# Patient Record
Sex: FEMALE | Race: WHITE | HISPANIC OR LATINO | Employment: FULL TIME | URBAN - NONMETROPOLITAN AREA
[De-identification: names, ages, dates, MRNs, and addresses within clinical notes are randomized per-mention and may not be internally consistent; named-entity substitution may affect disease eponyms.]

---

## 2024-02-20 ENCOUNTER — OFFICE VISIT (OUTPATIENT)
Dept: URGENT CARE | Facility: PHYSICIAN GROUP | Age: 24
End: 2024-02-20

## 2024-02-20 ENCOUNTER — HOSPITAL ENCOUNTER (OUTPATIENT)
Facility: MEDICAL CENTER | Age: 24
End: 2024-02-20
Attending: PHYSICIAN ASSISTANT

## 2024-02-20 VITALS
HEART RATE: 93 BPM | DIASTOLIC BLOOD PRESSURE: 62 MMHG | HEIGHT: 64 IN | WEIGHT: 176 LBS | BODY MASS INDEX: 30.05 KG/M2 | SYSTOLIC BLOOD PRESSURE: 118 MMHG | TEMPERATURE: 98.3 F | RESPIRATION RATE: 16 BRPM | OXYGEN SATURATION: 98 %

## 2024-02-20 DIAGNOSIS — R11.0 NAUSEA: ICD-10-CM

## 2024-02-20 DIAGNOSIS — N12 PYELONEPHRITIS: ICD-10-CM

## 2024-02-20 DIAGNOSIS — R30.0 DYSURIA: ICD-10-CM

## 2024-02-20 DIAGNOSIS — Z87.448 HISTORY OF PYELONEPHRITIS: ICD-10-CM

## 2024-02-20 LAB
APPEARANCE UR: CLEAR
BILIRUB UR STRIP-MCNC: NORMAL MG/DL
COLOR UR AUTO: YELLOW
GLUCOSE UR STRIP.AUTO-MCNC: NORMAL MG/DL
KETONES UR STRIP.AUTO-MCNC: NORMAL MG/DL
LEUKOCYTE ESTERASE UR QL STRIP.AUTO: NORMAL
NITRITE UR QL STRIP.AUTO: NORMAL
PH UR STRIP.AUTO: 6 [PH] (ref 5–8)
POCT INT CON NEG: NEGATIVE
POCT INT CON POS: POSITIVE
POCT URINE PREGNANCY TEST: NEGATIVE
PROT UR QL STRIP: NORMAL MG/DL
RBC UR QL AUTO: NORMAL
SP GR UR STRIP.AUTO: 1.02
UROBILINOGEN UR STRIP-MCNC: 0.2 MG/DL

## 2024-02-20 PROCEDURE — 81002 URINALYSIS NONAUTO W/O SCOPE: CPT | Performed by: PHYSICIAN ASSISTANT

## 2024-02-20 PROCEDURE — 81025 URINE PREGNANCY TEST: CPT | Performed by: PHYSICIAN ASSISTANT

## 2024-02-20 PROCEDURE — 99204 OFFICE O/P NEW MOD 45 MIN: CPT | Performed by: PHYSICIAN ASSISTANT

## 2024-02-20 PROCEDURE — 87086 URINE CULTURE/COLONY COUNT: CPT

## 2024-02-20 RX ORDER — ONDANSETRON 4 MG/1
4 TABLET, FILM COATED ORAL EVERY 4 HOURS PRN
Qty: 10 TABLET | Refills: 0 | Status: SHIPPED | OUTPATIENT
Start: 2024-02-20 | End: 2024-03-27

## 2024-02-20 RX ORDER — SULFAMETHOXAZOLE AND TRIMETHOPRIM 800; 160 MG/1; MG/1
1 TABLET ORAL 2 TIMES DAILY
Qty: 20 TABLET | Refills: 0 | Status: SHIPPED | OUTPATIENT
Start: 2024-02-20 | End: 2024-03-01

## 2024-02-20 ASSESSMENT — ENCOUNTER SYMPTOMS
NAUSEA: 0
CHILLS: 1
VOMITING: 0
FLANK PAIN: 1

## 2024-02-20 NOTE — PROGRESS NOTES
Subjective:   Fabiola Alexis is a 23 y.o. female who presents for Dysuria (HA, Nausea, fever, chills, left flank pain, blood in urine.  Sx 2 days )        Patient presents with concerns of dysuria, urinary urgency, urinary frequency and left flank pain for the last 48 hours.  Additionally she noticed blood in her urine.  She reports tactile fevers and chills.  She has had nausea, but no vomiting.  Mild pelvic pain but no pain of the upper abdomen.  States that she had identical symptoms a month ago with a kidney infection and was in the hospital with this.  She denies history of kidney stones and states that she underwent a CT when she was hospitalized, as they initially thought that her symptoms may be secondary to kidney stones.  She also underwent STI testing, which was negative.  Patient denies concerns for STI, she is in a monogamous relationship with the same partner for several years.      Review of Systems   Constitutional:  Positive for chills.   Gastrointestinal:  Negative for nausea and vomiting.   Genitourinary:  Positive for dysuria, flank pain (left side), frequency, hematuria and urgency.       PMH:  has no past medical history on file.  MEDS:   Current Outpatient Medications:     ondansetron (ZOFRAN) 4 MG Tab tablet, Take 1 Tablet by mouth every four hours as needed for Nausea/Vomiting., Disp: 10 Tablet, Rfl: 0    sulfamethoxazole-trimethoprim (BACTRIM DS) 800-160 MG tablet, Take 1 Tablet by mouth 2 times a day for 10 days., Disp: 20 Tablet, Rfl: 0    Current Facility-Administered Medications:     cefTRIAXone (Rocephin) 1 g in lidocaine (Xylocaine) 1 % 4 mL for IM use, 1 g, Intramuscular, Once, Chris Tran P.A.-C.  ALLERGIES: No Known Allergies  SURGHX: History reviewed. No pertinent surgical history.  SOCHX:  reports that she has never smoked. She has never used smokeless tobacco. She reports current alcohol use. She reports current drug use. Drug: Marijuana.  FH: Family history was reviewed, no  "pertinent findings to report   Objective:   /62   Pulse 93   Temp 36.8 °C (98.3 °F) (Temporal)   Resp 16   Ht 1.626 m (5' 4\")   Wt 79.8 kg (176 lb)   SpO2 98%   BMI 30.21 kg/m²   Physical Exam  Vitals reviewed.   Constitutional:       General: She is not in acute distress.     Appearance: Normal appearance. She is well-developed. She is not toxic-appearing.   HENT:      Head: Normocephalic and atraumatic.      Right Ear: External ear normal.      Left Ear: External ear normal.      Nose: Nose normal.   Cardiovascular:      Rate and Rhythm: Normal rate and regular rhythm.   Pulmonary:      Effort: Pulmonary effort is normal. No respiratory distress.      Breath sounds: No stridor.   Abdominal:      General: Abdomen is flat.      Tenderness: There is abdominal tenderness in the suprapubic area. There is left CVA tenderness. There is no right CVA tenderness, guarding or rebound. Negative signs include McBurney's sign.   Skin:     General: Skin is dry.   Neurological:      Comments: Alert and oriented.    Psychiatric:         Speech: Speech normal.         Behavior: Behavior normal.           Assessment/Plan:   1. Pyelonephritis  - cefTRIAXone (Rocephin) 1 g in lidocaine (Xylocaine) 1 % 4 mL for IM use  - sulfamethoxazole-trimethoprim (BACTRIM DS) 800-160 MG tablet; Take 1 Tablet by mouth 2 times a day for 10 days.  Dispense: 20 Tablet; Refill: 0  - URINE CULTURE(NEW); Future    2. Dysuria  - POCT Urinalysis  - POCT Pregnancy  - URINE CULTURE(NEW); Future    3. Nausea  - ondansetron (ZOFRAN) 4 MG Tab tablet; Take 1 Tablet by mouth every four hours as needed for Nausea/Vomiting.  Dispense: 10 Tablet; Refill: 0    4. History of pyelonephritis    UA positive for RBCs.  Unfortunately I do not have patient's prior records available for review.  Presentation suggestive of stone pathology, but as patient was recently evaluated for possible kidney stones and CT was negative I feel this less likely.  Per patient she " had identical symptoms with episode of pyelonephritis just a month ago.  Recommend that we treat empirically and send urine for culture.    Continue to push fluids.  Zofran as needed for nausea.  Urine culture pending.    If patient symptoms fail to improve within the next 24 hours or worsen any point I would like her to go to the emergency room for reevaluation and further management.  All questions and concerns addressed.  Patient is comfortable with treatment plan and verbalized good understanding of ED precautions.

## 2024-02-22 LAB
BACTERIA UR CULT: NORMAL
SIGNIFICANT IND 70042: NORMAL
SITE SITE: NORMAL
SOURCE SOURCE: NORMAL

## 2024-02-26 NOTE — RESULT ENCOUNTER NOTE
Please call pt with negative results. If sx haven't full resolved recommend f/u with PCP. Any severe sx to ED.  JH

## 2024-03-27 ENCOUNTER — HOSPITAL ENCOUNTER (OUTPATIENT)
Facility: MEDICAL CENTER | Age: 24
End: 2024-03-27
Attending: NURSE PRACTITIONER

## 2024-03-27 ENCOUNTER — OFFICE VISIT (OUTPATIENT)
Dept: URGENT CARE | Facility: PHYSICIAN GROUP | Age: 24
End: 2024-03-27

## 2024-03-27 VITALS
OXYGEN SATURATION: 100 % | RESPIRATION RATE: 16 BRPM | WEIGHT: 179 LBS | SYSTOLIC BLOOD PRESSURE: 108 MMHG | DIASTOLIC BLOOD PRESSURE: 74 MMHG | HEIGHT: 64 IN | TEMPERATURE: 97.7 F | BODY MASS INDEX: 30.56 KG/M2 | HEART RATE: 63 BPM

## 2024-03-27 DIAGNOSIS — N39.0 ACUTE URINARY TRACT INFECTION: ICD-10-CM

## 2024-03-27 DIAGNOSIS — Z87.448 HISTORY OF PYELONEPHRITIS: ICD-10-CM

## 2024-03-27 LAB
APPEARANCE UR: CLEAR
BILIRUB UR STRIP-MCNC: NEGATIVE MG/DL
COLOR UR AUTO: YELLOW
GLUCOSE UR STRIP.AUTO-MCNC: NEGATIVE MG/DL
KETONES UR STRIP.AUTO-MCNC: NEGATIVE MG/DL
LEUKOCYTE ESTERASE UR QL STRIP.AUTO: NORMAL
NITRITE UR QL STRIP.AUTO: NEGATIVE
PH UR STRIP.AUTO: 6.5 [PH] (ref 5–8)
POCT INT CON NEG: NEGATIVE
POCT INT CON POS: POSITIVE
POCT URINE PREGNANCY TEST: NEGATIVE
PROT UR QL STRIP: NEGATIVE MG/DL
RBC UR QL AUTO: NORMAL
SP GR UR STRIP.AUTO: 1.02
UROBILINOGEN UR STRIP-MCNC: 0.2 MG/DL

## 2024-03-27 PROCEDURE — 99213 OFFICE O/P EST LOW 20 MIN: CPT | Performed by: NURSE PRACTITIONER

## 2024-03-27 PROCEDURE — 87086 URINE CULTURE/COLONY COUNT: CPT

## 2024-03-27 PROCEDURE — 81002 URINALYSIS NONAUTO W/O SCOPE: CPT | Performed by: NURSE PRACTITIONER

## 2024-03-27 PROCEDURE — 3078F DIAST BP <80 MM HG: CPT | Performed by: NURSE PRACTITIONER

## 2024-03-27 PROCEDURE — 3074F SYST BP LT 130 MM HG: CPT | Performed by: NURSE PRACTITIONER

## 2024-03-27 PROCEDURE — 81025 URINE PREGNANCY TEST: CPT | Performed by: NURSE PRACTITIONER

## 2024-03-27 RX ORDER — NITROFURANTOIN 25; 75 MG/1; MG/1
100 CAPSULE ORAL 2 TIMES DAILY
Qty: 14 CAPSULE | Refills: 0 | Status: SHIPPED | OUTPATIENT
Start: 2024-03-27 | End: 2024-04-03

## 2024-03-27 NOTE — PROGRESS NOTES
Chief Complaint   Patient presents with    UTI     X yesterday with pain with urination, urgency and frequency.        HISTORY OF PRESENT ILLNESS: Patient is a pleasant 23 y.o. female who presents today due to two days of urinary burning, urgency, and frequency. Reports some associated pelvic pressure. Denies hematuria, fever, flank pain, N/V. Admits to history of pyelonephritis, denies history of kidney stones.  She was treated in urgent care last month for pyelonephritis, urine culture results negative, patient improved with antibiotics.  She believes this is her third episode of kidney infection, has never seen urologist.    There are no problems to display for this patient.      Allergies:Patient has no known allergies.    Current Outpatient Medications Ordered in Epic   Medication Sig Dispense Refill    nitrofurantoin (MACROBID) 100 MG Cap Take 1 Capsule by mouth 2 times a day for 7 days. 14 Capsule 0     No current Epic-ordered facility-administered medications on file.       History reviewed. No pertinent past medical history.    Social History     Tobacco Use    Smoking status: Never    Smokeless tobacco: Never   Substance Use Topics    Alcohol use: Yes    Drug use: Yes     Types: Marijuana       No family status information on file.   History reviewed. No pertinent family history.    ROS:  Review of Systems   Constitutional: Negative for fever, chills, weight loss and malaise/fatigue.   HENT: Negative for ear pain, nosebleeds, congestion, sore throat and neck pain.    Eyes: Negative for vision changes.   Neuro: Negative for headache, sensory changes, weakness, seizure, LOC.   Cardiovascular: Negative for chest pain, palpitations, orthopnea and leg swelling.   Respiratory: Negative for cough, sputum production, shortness of breath and wheezing.   Gastrointestinal: Positive for lower abdominal pressure. Negative for abdominal pain, nausea, vomiting or diarrhea.   Genitourinary: Positive for dysuria, urgency  "and frequency. Negative for hematuria or flank pain.   Skin: Negative for rash, diaphoresis.     Exam:  /74   Pulse 63   Temp 36.5 °C (97.7 °F) (Temporal)   Resp 16   Ht 1.626 m (5' 4\")   Wt 81.2 kg (179 lb)   SpO2 100%   General: well-nourished, well-developed female in NAD  Head: normocephalic, atraumatic  Eyes: PERRLA, no conjunctival injection, acuity grossly intact, lids normal.  Lymph: no cervical adenopathy. No supraclavicular adenopathy.   Neuro: alert and oriented. Cranial nerves 1-12 grossly intact. No sensory deficit.   Cardiovascular: regular rate and rhythm. No edema.  Pulmonary: no distress. Chest is symmetrical with respiration, no wheezes, crackles, or rhonchi.   Abdomen: soft, non-tender, no guarding, no hepatosplenomegaly. No CVA tenderness.   Musculoskeletal: no clubbing, appropriate muscle tone, gait is stable.  Skin: warm, dry, intact, no clubbing, no cyanosis, no rashes.   Psych: appropriate mood, affect, judgement.       POC pregnancy negative    POC urine positive for blood and leukocytes      Assessment/Plan:  1. Acute urinary tract infection  POCT Urinalysis    POCT Pregnancy    URINE CULTURE(NEW)    Referral to Urology    nitrofurantoin (MACROBID) 100 MG Cap      2. History of pyelonephritis  URINE CULTURE(NEW)    Referral to Urology            Antibiotic as directed. Increase fluid intake. Urine sent for culture.   Supportive care, differential diagnoses, and indications for immediate follow-up discussed with patient.   Pathogenesis of diagnosis discussed including typical length and natural progression.   Instructed to return to clinic or nearest emergency department for any change in condition, further concerns, or worsening of symptoms.  Patient states understanding of the plan of care and discharge instructions.  Instructed to make an appointment, for follow up, with their primary care provider.        Please note that this dictation was created using voice recognition " software. I have made every reasonable attempt to correct obvious errors, but I expect that there are errors of grammar and possibly content that I did not discover before finalizing the note.  Previous clinic visit encounter reviewed and considered in medical decision making today.       RADHA Gallegos.

## 2024-03-27 NOTE — LETTER
March 27, 2024         Patient: Fabiola Alexis   YOB: 2000   Date of Visit: 3/27/2024           To Whom it May Concern:    Fabiola Alexis was seen in my clinic on 3/27/2024. She may be excused from work today and tomorrow if needed.     If you have any questions or concerns, please don't hesitate to call.        Sincerely,           RADHA Gallegos.  Electronically Signed

## 2024-03-29 LAB
BACTERIA UR CULT: NORMAL
SIGNIFICANT IND 70042: NORMAL
SITE SITE: NORMAL
SOURCE SOURCE: NORMAL

## 2024-05-19 ENCOUNTER — OFFICE VISIT (OUTPATIENT)
Dept: URGENT CARE | Facility: PHYSICIAN GROUP | Age: 24
End: 2024-05-19

## 2024-05-19 VITALS
WEIGHT: 187 LBS | HEART RATE: 82 BPM | TEMPERATURE: 98.6 F | SYSTOLIC BLOOD PRESSURE: 124 MMHG | RESPIRATION RATE: 18 BRPM | OXYGEN SATURATION: 97 % | DIASTOLIC BLOOD PRESSURE: 64 MMHG | HEIGHT: 64 IN | BODY MASS INDEX: 31.92 KG/M2

## 2024-05-19 DIAGNOSIS — R11.0 NAUSEA: ICD-10-CM

## 2024-05-19 DIAGNOSIS — Z32.01 POSITIVE URINE PREGNANCY TEST: ICD-10-CM

## 2024-05-19 LAB
APPEARANCE UR: CLEAR
BILIRUB UR STRIP-MCNC: NEGATIVE MG/DL
COLOR UR AUTO: YELLOW
GLUCOSE UR STRIP.AUTO-MCNC: NEGATIVE MG/DL
KETONES UR STRIP.AUTO-MCNC: NEGATIVE MG/DL
LEUKOCYTE ESTERASE UR QL STRIP.AUTO: NEGATIVE
NITRITE UR QL STRIP.AUTO: NEGATIVE
PH UR STRIP.AUTO: 6.5 [PH] (ref 5–8)
PROT UR QL STRIP: NEGATIVE MG/DL
RBC UR QL AUTO: NEGATIVE
SP GR UR STRIP.AUTO: 1.02
UROBILINOGEN UR STRIP-MCNC: 0.2 MG/DL

## 2024-05-19 PROCEDURE — 99213 OFFICE O/P EST LOW 20 MIN: CPT | Performed by: PHYSICIAN ASSISTANT

## 2024-05-19 PROCEDURE — 81002 URINALYSIS NONAUTO W/O SCOPE: CPT | Performed by: PHYSICIAN ASSISTANT

## 2024-05-19 RX ORDER — DOXYLAMINE SUCCINATE AND PYRIDOXINE HYDROCHLORIDE, DELAYED RELEASE TABLETS 10 MG/10 MG 10; 10 MG/1; MG/1
1 TABLET, DELAYED RELEASE ORAL 3 TIMES DAILY PRN
Qty: 60 TABLET | Refills: 1 | Status: SHIPPED | OUTPATIENT
Start: 2024-05-19

## 2024-05-19 RX ORDER — ONDANSETRON 4 MG/1
4 TABLET, ORALLY DISINTEGRATING ORAL EVERY 6 HOURS PRN
Qty: 20 TABLET | Refills: 0 | Status: SHIPPED | OUTPATIENT
Start: 2024-05-19

## 2024-05-19 ASSESSMENT — ENCOUNTER SYMPTOMS
FEVER: 0
SWOLLEN GLANDS: 0
NEUROLOGICAL NEGATIVE: 1
ABDOMINAL PAIN: 0
DIARRHEA: 0
RESPIRATORY NEGATIVE: 1
CONSTITUTIONAL NEGATIVE: 1
VOMITING: 0
CARDIOVASCULAR NEGATIVE: 1
NAUSEA: 1

## 2024-05-19 NOTE — PROGRESS NOTES
"Subjective     Fabiola Alexis is a very pleasant 23 y.o. female who presents with Nausea (Took a pregnancy test yesterday, POS, intense nausea and cramping x 2-3 times.  )            Positive at-home pregnancy test.  LMP 4/14 to 4/17.  Having constant nausea.  Has tried OTC meds with limited relief.  Slight urinary frequency but no dysuria, abdominal pain, fever or vaginal bleeding.  No pertinent past gynecological history.    Nausea  This is a new problem. The current episode started 1 to 4 weeks ago. The problem occurs daily. The problem has been waxing and waning. Associated symptoms include nausea. Pertinent negatives include no abdominal pain, fever, rash, swollen glands, urinary symptoms or vomiting. She has tried nothing for the symptoms. The treatment provided no relief.       PMH:  has no past medical history on file.  MEDS: No current outpatient medications on file.  ALLERGIES: No Known Allergies  SURGHX: No past surgical history on file.  SOCHX:  reports that she has never smoked. She has never used smokeless tobacco. She reports current alcohol use. She reports current drug use. Drug: Marijuana.  FH: family history is not on file.        Review of Systems   Constitutional: Negative.  Negative for fever.   Respiratory: Negative.     Cardiovascular: Negative.    Gastrointestinal:  Positive for nausea. Negative for abdominal pain, diarrhea and vomiting.   Genitourinary:  Positive for frequency.   Skin:  Negative for rash.   Neurological: Negative.        Medications, Allergies, and current problem list reviewed today in Epic           Objective     /64   Pulse 82   Temp 37 °C (98.6 °F) (Temporal)   Resp 18   Ht 1.626 m (5' 4\")   Wt 84.8 kg (187 lb)   SpO2 97%   BMI 32.10 kg/m²      Physical Exam  Vitals and nursing note reviewed.   Constitutional:       General: She is not in acute distress.     Appearance: Normal appearance. She is well-developed. She is not ill-appearing, toxic-appearing or " diaphoretic.   HENT:      Head: Normocephalic and atraumatic.      Right Ear: Hearing and external ear normal.      Left Ear: Hearing and external ear normal.      Nose: Nose normal.   Eyes:      General:         Right eye: No discharge.         Left eye: No discharge.      Conjunctiva/sclera: Conjunctivae normal.   Cardiovascular:      Rate and Rhythm: Normal rate and regular rhythm.      Heart sounds: Normal heart sounds.   Pulmonary:      Effort: Pulmonary effort is normal. No respiratory distress.      Breath sounds: Normal breath sounds. No wheezing, rhonchi or rales.   Abdominal:      General: Abdomen is flat. There is no distension.      Palpations: Abdomen is soft.      Tenderness: There is no abdominal tenderness. There is no right CVA tenderness, left CVA tenderness, guarding or rebound.      Comments: No suprapubic tenderness   Musculoskeletal:      Cervical back: Normal range of motion and neck supple.   Skin:     General: Skin is warm and dry.   Neurological:      General: No focal deficit present.      Mental Status: She is alert and oriented to person, place, and time. Mental status is at baseline.   Psychiatric:         Mood and Affect: Mood normal.         Behavior: Behavior normal.         Thought Content: Thought content normal.         Judgment: Judgment normal.                             Assessment & Plan        1. Positive urine pregnancy test  POCT Urinalysis      2. Nausea  Doxylamine-Pyridoxine 10-10 MG Tablet Delayed Response delayed-release tablet    ondansetron (ZOFRAN ODT) 4 MG TABLET DISPERSIBLE    POCT Urinalysis        LMP 4/14 to 4/17.  DILAN 1/22/2025.  Constant nausea.  No fever, abdominal pain, vomiting, UTI symptoms or vaginal bleeding.  Vitals and exam unremarkable.  Urinalysis negative.  Doxylamine with pyridoxine for nausea.  Zofran for breakthrough.  She does have appointment with OB in 10 days      I personally reviewed prior external notes and test results pertinent to  today's visit. Return to clinic or go to ED if symptoms worsen or persist. Red flag symptoms and indications for ED discussed at length. Patient/Parent/Guardian voices understanding.  AVS with post-visit instructions printed and provided or given verbally.  Follow-up with your primary care provider in 3-5 days. All side effects and potential interactions of prescribed medication discussed including allergic response, GI upset, tendon injury, rash, sedation, OCP effectiveness, etc.    Please note that this dictation was created using voice recognition software. I have made every reasonable attempt to correct obvious errors, but I expect that there are errors of grammar and possibly content that I did not discover before finalizing the note.

## 2024-05-19 NOTE — LETTER
May 19, 2024         Patient: Fabiola Alexis   YOB: 2000   Date of Visit: 5/19/2024           To Whom it May Concern:    Fabiola Alexis was seen in my clinic on 5/19/2024. Please excuse any absences from work this week due to acute condition.        If you have any questions or concerns, please don't hesitate to call.        Sincerely,           Diaz Oneill P.A.-C.  Electronically Signed

## 2024-06-03 ENCOUNTER — TELEPHONE (OUTPATIENT)
Dept: URGENT CARE | Facility: PHYSICIAN GROUP | Age: 24
End: 2024-06-03

## 2024-06-03 DIAGNOSIS — R11.0 NAUSEA: ICD-10-CM

## 2024-06-03 RX ORDER — ONDANSETRON 4 MG/1
4 TABLET, ORALLY DISINTEGRATING ORAL EVERY 6 HOURS PRN
Qty: 20 TABLET | Refills: 0 | Status: SHIPPED | OUTPATIENT
Start: 2024-06-03

## 2024-06-18 ENCOUNTER — APPOINTMENT (OUTPATIENT)
Dept: RADIOLOGY | Facility: IMAGING CENTER | Age: 24
End: 2024-06-18
Attending: NURSE PRACTITIONER

## 2024-06-18 ENCOUNTER — APPOINTMENT (OUTPATIENT)
Dept: OBGYN | Facility: CLINIC | Age: 24
End: 2024-06-18

## 2024-06-18 DIAGNOSIS — N91.2 AMENORRHEA: ICD-10-CM

## 2024-06-18 PROCEDURE — 76801 OB US < 14 WKS SINGLE FETUS: CPT | Mod: TC | Performed by: NURSE PRACTITIONER

## 2024-06-19 ENCOUNTER — INITIAL PRENATAL (OUTPATIENT)
Dept: OBGYN | Facility: CLINIC | Age: 24
End: 2024-06-19

## 2024-06-19 ENCOUNTER — APPOINTMENT (OUTPATIENT)
Dept: OBGYN | Facility: CLINIC | Age: 24
End: 2024-06-19

## 2024-06-19 VITALS — SYSTOLIC BLOOD PRESSURE: 100 MMHG | WEIGHT: 177 LBS | BODY MASS INDEX: 30.38 KG/M2 | DIASTOLIC BLOOD PRESSURE: 62 MMHG

## 2024-06-19 DIAGNOSIS — O99.341: ICD-10-CM

## 2024-06-19 DIAGNOSIS — Z86.59 HISTORY OF DEPRESSION: ICD-10-CM

## 2024-06-19 DIAGNOSIS — J45.990 EXERCISE-INDUCED ASTHMA: ICD-10-CM

## 2024-06-19 DIAGNOSIS — Z34.00 SUPERVISION OF NORMAL FIRST PREGNANCY, ANTEPARTUM: ICD-10-CM

## 2024-06-19 DIAGNOSIS — F32.A: ICD-10-CM

## 2024-06-19 PROCEDURE — 8198 PREG CTR PKG RATE (SYSTEM): Performed by: MIDWIFE

## 2024-06-19 PROCEDURE — 3074F SYST BP LT 130 MM HG: CPT | Performed by: MIDWIFE

## 2024-06-19 PROCEDURE — 3078F DIAST BP <80 MM HG: CPT | Performed by: MIDWIFE

## 2024-06-19 PROCEDURE — 0500F INITIAL PRENATAL CARE VISIT: CPT | Performed by: MIDWIFE

## 2024-06-19 ASSESSMENT — EDINBURGH POSTNATAL DEPRESSION SCALE (EPDS)
THINGS HAVE BEEN GETTING ON TOP OF ME: YES, SOMETIMES I HAVEN'T BEEN COPING AS WELL AS USUAL
TOTAL SCORE: 18
I HAVE BEEN SO UNHAPPY THAT I HAVE HAD DIFFICULTY SLEEPING: YES, SOMETIMES
I HAVE FELT SCARED OR PANICKY FOR NO GOOD REASON: YES, QUITE A LOT
I HAVE BEEN SO UNHAPPY THAT I HAVE BEEN CRYING: YES, MOST OF THE TIME
I HAVE LOOKED FORWARD WITH ENJOYMENT TO THINGS: AS MUCH AS I EVER DID
THE THOUGHT OF HARMING MYSELF HAS OCCURRED TO ME: NEVER
I HAVE BEEN ANXIOUS OR WORRIED FOR NO GOOD REASON: YES, VERY OFTEN
I HAVE FELT SAD OR MISERABLE: YES, QUITE OFTEN
I HAVE BLAMED MYSELF UNNECESSARILY WHEN THINGS WENT WRONG: YES, MOST OF THE TIME
I HAVE BEEN ABLE TO LAUGH AND SEE THE FUNNY SIDE OF THINGS: AS MUCH AS I ALWAYS COULD

## 2024-06-19 NOTE — PROGRESS NOTES
NOB today  LMP: 04/14/2024  Last pap: 2023 normal at [facility]  Pharmacy confirmed  On PNV  Genetic screening ordered  EPDS=18  c/o of Nausea,depression

## 2024-06-19 NOTE — PROGRESS NOTES
Risk factors:   None  Referrals made today:   None    Subjective:   Fabiola Alexis is a 23 y.o.  who presents for her new OB exam.  She is 9w3d with an DILAN of Estimated Date of Delivery: 25 by LMP c/w 9.0 wk US.   She is feeling nauseated but not vomiting. She is taking in food and fluids but both are restricted based on her food aversions and nausea. Feeling depressed due to her fatigue and nausea. EDPS . Hx depression and anxiety. Desires talk therapy, declines medication at this time. Denies SI/HI. She reports no ER visits or previous care in this pregnancy. Reports no fetal movement.     Vaginal bleeding denies  Pain   denies  Cramping  denies  History of thyroid disease denies  History of high blood pressure denies  History of uterine surgery denies    Pre-eclampsia risk factors:   1 of the following: none  2 of the following: nulliparity    Diabetes risk factors:   One of the following: none    BMI greater than or equal to 25 and one of the following: none    FOB is involved. His name is Addi.    Pregnancy is unplanned but desired.    She is currently not working.   Denies alcohol use, drug use, or tobacco use in pregnancy.     Denies any current or hx of sexual, emotional or physical abuse or trauma.     Current Medications: PNV, Zofran prn    Allergies: None    Past Medical History:   Diagnosis Date    Asthma     Depression        OB History    Para Term  AB Living   1             SAB IAB Ectopic Molar Multiple Live Births                    # Outcome Date GA Lbr Titus/2nd Weight Sex Delivery Anes PTL Lv   1 Current                 Past Surgical History:   Procedure Laterality Date    EYE SURGERY          Gynecological History  STIs  Hx chlamydia   HSV  Denies  Abnormal pap Denies - last pap 2023, will request records    Psych History   Depression:  Admits. Never used medication  Anxiety   Admits. Never used medication  Bipolar   denies  Postpartum Mood  Changes NA      Family History   Problem Relation Age of Onset    No Known Problems Mother     No Known Problems Father     No Known Problems Sister     No Known Problems Brother      Denies genetic disorders in family history.     Objective:      Vitals:    06/19/24 1006   BP: 100/62   Weight: 177 lb        Physical Exam  Vitals reviewed. Exam conducted with a chaperone present.   Constitutional:       General: She is not in acute distress.     Appearance: Normal appearance.   HENT:      Head: Normocephalic.   Neck:      Thyroid: No thyroid mass, thyromegaly or thyroid tenderness.   Cardiovascular:      Rate and Rhythm: Normal rate and regular rhythm.      Heart sounds: Normal heart sounds.   Pulmonary:      Effort: Pulmonary effort is normal.      Breath sounds: Normal breath sounds.   Chest:   Breasts:     Right: Normal.      Left: Normal.   Abdominal:      General: Abdomen is flat.      Palpations: Abdomen is soft.      Tenderness: There is no abdominal tenderness.   Genitourinary:     General: Normal vulva.      Exam position: Lithotomy position.      Labia:         Right: No rash, tenderness, lesion or injury.         Left: No rash, tenderness, lesion or injury.       Urethra: No prolapse, urethral pain, urethral swelling or urethral lesion.      Vagina: Normal.      Cervix: Normal.      Uterus: Normal.       Adnexa: Right adnexa normal and left adnexa normal.      Rectum: Normal.   Musculoskeletal:         General: Normal range of motion.      Cervical back: Normal range of motion.   Lymphadenopathy:      Cervical: No cervical adenopathy.      Upper Body:      Right upper body: No supraclavicular, axillary or pectoral adenopathy.      Left upper body: No supraclavicular, axillary or pectoral adenopathy.      Lower Body: No right inguinal adenopathy. No left inguinal adenopathy.   Skin:     General: Skin is warm and dry.   Neurological:      Mental Status: She is alert and oriented to person, place, and time.    Psychiatric:         Attention and Perception: Attention normal.         Mood and Affect: Mood normal.         Speech: Speech normal.         Behavior: Behavior normal.         Cognition and Memory: Cognition normal.           See Prenatal Flowsheet for vitals    A/P:     Problem List Items Addressed This Visit          Women's Health Problems    Supervision of normal first pregnancy, antepartum     Assessment:  1. 23 y.o.  with IUP @ 9w3d per LMP  2.  S=D  3. See problem list for conditions affecting pregnancy    Plan:   - Discussed carrier screening and genetic testing in pregnancy. Patient desires NIPT  - Prenatal labs ordered - lab slip provided  - Discussed PNV, nutrition, adequate water intake, and exercise/weight gain in pregnancy  - Discuss lifestyle changes for nausea. Discuss warning signs of decreased food and or fluid intake, when to go to ED.  - S/sx of pregnancy warning signs and PTL precautions given  - Complete OB US in  11  wks  - Return to clinic in 4 weeks.         Relevant Orders    PREG CNTR PRENATAL PN    URINE DRUG SCREEN    US-OB 2ND 3RD TRI COMPLETE    PANORAMA PRENATAL TEST    Chlamydia/GC, PCR (Urine)       Other    Exercise-induced asthma     Other Visit Diagnoses        depression in first trimester        Relevant Orders    Referral to Behavioral Health    History of depression        Relevant Orders    Referral to Behavioral Health             Gabriela De Jesus C.N.M.

## 2024-06-19 NOTE — ASSESSMENT & PLAN NOTE
Assessment:  1. 23 y.o.  with IUP @ 9w3d per LMP  2.  S=D  3. See problem list for conditions affecting pregnancy    Plan:   - Discussed carrier screening and genetic testing in pregnancy. Patient desires NIPT  - Prenatal labs ordered - lab slip provided  - Discussed PNV, nutrition, adequate water intake, and exercise/weight gain in pregnancy  - Discuss lifestyle changes for nausea. Discuss warning signs of decreased food and or fluid intake, when to go to ED.  - S/sx of pregnancy warning signs and PTL precautions given  - Complete OB US in  11  wks  - Return to clinic in 4 weeks.

## 2024-07-02 DIAGNOSIS — R11.0 NAUSEA: ICD-10-CM

## 2024-07-02 RX ORDER — ONDANSETRON 4 MG/1
4 TABLET, ORALLY DISINTEGRATING ORAL EVERY 6 HOURS PRN
Qty: 20 TABLET | Refills: 0 | Status: SHIPPED | OUTPATIENT
Start: 2024-07-02 | End: 2024-07-18

## 2024-07-15 ENCOUNTER — HOSPITAL ENCOUNTER (OUTPATIENT)
Dept: LAB | Facility: MEDICAL CENTER | Age: 24
End: 2024-07-15
Attending: MIDWIFE
Payer: COMMERCIAL

## 2024-07-15 DIAGNOSIS — Z34.00 SUPERVISION OF NORMAL FIRST PREGNANCY, ANTEPARTUM: ICD-10-CM

## 2024-07-15 DIAGNOSIS — R11.0 NAUSEA: ICD-10-CM

## 2024-07-15 LAB
ABO GROUP BLD: NORMAL
BLD GP AB SCN SERPL QL: NORMAL
ERYTHROCYTE [DISTWIDTH] IN BLOOD BY AUTOMATED COUNT: 38 FL (ref 35.9–50)
HBV SURFACE AG SER QL: ABNORMAL
HCT VFR BLD AUTO: 39.7 % (ref 37–47)
HCV AB SER QL: ABNORMAL
HGB BLD-MCNC: 14.6 G/DL (ref 12–16)
HIV 1+2 AB+HIV1 P24 AG SERPL QL IA: NORMAL
MCH RBC QN AUTO: 33.1 PG (ref 27–33)
MCHC RBC AUTO-ENTMCNC: 36.8 G/DL (ref 32.2–35.5)
MCV RBC AUTO: 90 FL (ref 81.4–97.8)
PLATELET # BLD AUTO: 234 K/UL (ref 164–446)
PMV BLD AUTO: 10.7 FL (ref 9–12.9)
RBC # BLD AUTO: 4.41 M/UL (ref 4.2–5.4)
RH BLD: NORMAL
RUBV AB SER QL: 45.6 IU/ML
T PALLIDUM AB SER QL IA: ABNORMAL
WBC # BLD AUTO: 11.9 K/UL (ref 4.8–10.8)

## 2024-07-15 NOTE — TELEPHONE ENCOUNTER
Received request via: Patient    Was the patient seen in the last year in this department? Yes    Does the patient have an active prescription (recently filled or refills available) for medication(s) requested? No    Pharmacy Name: [facility]    Does the patient have [facility] and need 100 day supply (blood pressure, diabetes and cholesterol meds only)? Patient does not have SCP

## 2024-07-15 NOTE — TELEPHONE ENCOUNTER
Pt had asked via [employer] to give her a call. Pt answered phone call and had questions regarding her nausea. Pt was advised to follow the advice that was messaged on [employer]. Pt had questions regarding taking Dramamine, pt was advised to take as the bottle prescribed. Pt states she is having no VB, LOF, or UC. Pt was told when her next visit is and to follow up then regarding nausea. Pt verbalized understanding.

## 2024-07-16 LAB
C TRACH DNA SPEC QL NAA+PROBE: NEGATIVE
N GONORRHOEA DNA SPEC QL NAA+PROBE: NEGATIVE
SPECIMEN SOURCE: NORMAL

## 2024-07-17 LAB
BACTERIA UR CULT: NORMAL
SIGNIFICANT IND 70042: NORMAL
SITE SITE: NORMAL
SOURCE SOURCE: NORMAL

## 2024-07-18 ENCOUNTER — ROUTINE PRENATAL (OUTPATIENT)
Dept: OBGYN | Facility: CLINIC | Age: 24
End: 2024-07-18

## 2024-07-18 VITALS — DIASTOLIC BLOOD PRESSURE: 67 MMHG | WEIGHT: 169 LBS | BODY MASS INDEX: 29.01 KG/M2 | SYSTOLIC BLOOD PRESSURE: 103 MMHG

## 2024-07-18 DIAGNOSIS — O21.9 NAUSEA AND VOMITING DURING PREGNANCY: ICD-10-CM

## 2024-07-18 DIAGNOSIS — F41.9 ANXIETY AND DEPRESSION: ICD-10-CM

## 2024-07-18 DIAGNOSIS — F32.A ANXIETY AND DEPRESSION: ICD-10-CM

## 2024-07-18 DIAGNOSIS — Z34.00 SUPERVISION OF NORMAL FIRST PREGNANCY, ANTEPARTUM: ICD-10-CM

## 2024-07-18 PROCEDURE — 3078F DIAST BP <80 MM HG: CPT | Performed by: MIDWIFE

## 2024-07-18 PROCEDURE — 0502F SUBSEQUENT PRENATAL CARE: CPT | Performed by: MIDWIFE

## 2024-07-18 PROCEDURE — 3074F SYST BP LT 130 MM HG: CPT | Performed by: MIDWIFE

## 2024-07-18 RX ORDER — METOCLOPRAMIDE 10 MG/1
10 TABLET ORAL 4 TIMES DAILY
Qty: 120 TABLET | Refills: 1 | Status: SHIPPED | OUTPATIENT
Start: 2024-07-18

## 2024-07-18 RX ORDER — 0.9 % SODIUM CHLORIDE 0.9 %
3 VIAL (ML) INJECTION PRN
OUTPATIENT
Start: 2024-07-18

## 2024-07-18 RX ORDER — PNV NO.95/FERROUS FUM/FOLIC AC 28MG-0.8MG
1 TABLET ORAL DAILY
Qty: 90 TABLET | Refills: 5 | Status: SHIPPED | OUTPATIENT
Start: 2024-07-18

## 2024-07-18 RX ORDER — SODIUM CHLORIDE, SODIUM LACTATE, POTASSIUM CHLORIDE, AND CALCIUM CHLORIDE .6; .31; .03; .02 G/100ML; G/100ML; G/100ML; G/100ML
1000 INJECTION, SOLUTION INTRAVENOUS
Status: CANCELLED | OUTPATIENT
Start: 2024-07-18

## 2024-07-18 RX ORDER — ONDANSETRON 4 MG/1
4 TABLET, ORALLY DISINTEGRATING ORAL EVERY 6 HOURS PRN
Qty: 20 TABLET | Refills: 0 | OUTPATIENT
Start: 2024-07-18

## 2024-07-18 ASSESSMENT — EDINBURGH POSTNATAL DEPRESSION SCALE (EPDS)
I HAVE BEEN ABLE TO LAUGH AND SEE THE FUNNY SIDE OF THINGS: NOT QUITE SO MUCH NOW
I HAVE BEEN ANXIOUS OR WORRIED FOR NO GOOD REASON: YES, VERY OFTEN
THINGS HAVE BEEN GETTING ON TOP OF ME: YES, SOMETIMES I HAVEN'T BEEN COPING AS WELL AS USUAL
I HAVE BEEN SO UNHAPPY THAT I HAVE BEEN CRYING: YES, MOST OF THE TIME
I HAVE BLAMED MYSELF UNNECESSARILY WHEN THINGS WENT WRONG: YES, MOST OF THE TIME
I HAVE BEEN SO UNHAPPY THAT I HAVE HAD DIFFICULTY SLEEPING: YES, MOST OF THE TIME
I HAVE FELT SCARED OR PANICKY FOR NO GOOD REASON: YES, QUITE A LOT
THE THOUGHT OF HARMING MYSELF HAS OCCURRED TO ME: NEVER
I HAVE LOOKED FORWARD WITH ENJOYMENT TO THINGS: DEFINITELY LESS THAN I USED TO
I HAVE FELT SAD OR MISERABLE: YES, MOST OF THE TIME
TOTAL SCORE: 23

## 2024-07-22 RX ORDER — SODIUM CHLORIDE, SODIUM LACTATE, POTASSIUM CHLORIDE, AND CALCIUM CHLORIDE .6; .31; .03; .02 G/100ML; G/100ML; G/100ML; G/100ML
1000 INJECTION, SOLUTION INTRAVENOUS ONCE
OUTPATIENT
Start: 2024-07-22 | End: 2024-07-22

## 2024-07-24 LAB
AMPHET CTO UR CFM-MCNC: NEGATIVE NG/ML
BARBITURATES CTO UR CFM-MCNC: NEGATIVE NG/ML
BENZODIAZ CTO UR CFM-MCNC: NEGATIVE NG/ML
CANNABINOIDS CTO UR CFM-MCNC: NEGATIVE NG/ML
COCAINE CTO UR CFM-MCNC: NEGATIVE NG/ML
CREAT UR-MCNC: 335.9 MG/DL (ref 20–400)
DRUG COMMENT 753798: NORMAL
METHADONE CTO UR CFM-MCNC: NEGATIVE NG/ML
OPIATES CTO UR CFM-MCNC: NEGATIVE NG/ML
PCP CTO UR CFM-MCNC: NEGATIVE NG/ML
PROPOXYPH CTO UR CFM-MCNC: NEGATIVE NG/ML

## 2024-07-25 LAB
Lab: NORMAL
NTRA 1P36 DELETION SYNDROME POPULATION-BASED RISK TEXT: NORMAL
NTRA 1P36 DELETION SYNDROME RESULT TEXT: NORMAL
NTRA 1P36 DELETION SYNDROME RISK SCORE TEXT: NORMAL
NTRA 22Q11.2 DELETION SYNDROME POPULATION-BASED RISK TEXT: NORMAL
NTRA 22Q11.2 DELETION SYNDROME RESULT TEXT: NORMAL
NTRA 22Q11.2 DELETION SYNDROME RISK SCORE TEXT: NORMAL
NTRA ANGELMAN SYNDROME POPULATION-BASED RISK TEXT: NORMAL
NTRA ANGELMAN SYNDROME RESULT TEXT: NORMAL
NTRA ANGELMAN SYNDROME RISK SCORE TEXT: NORMAL
NTRA CRI-DU-CHAT SYNDROME POPULATION-BASED RISK TEXT: NORMAL
NTRA CRI-DU-CHAT SYNDROME RESULT TEXT: NORMAL
NTRA CRI-DU-CHAT SYNDROME RISK SCORE TEXT: NORMAL
NTRA FETAL FRACTION: NORMAL
NTRA GENDER OF FETUS: NORMAL
NTRA MONOSOMY X AGE-BASED RISK TEXT: NORMAL
NTRA MONOSOMY X RESULT TEXT: NORMAL
NTRA MONOSOMY X RISK SCORE TEXT: NORMAL
NTRA PRADER-WILLI SYNDROME POPULATION-BASED RISK TEXT: NORMAL
NTRA PRADER-WILLI SYNDROME RESULT TEXT: NORMAL
NTRA PRADER-WILLI SYNDROME RISK SCORE TEXT: NORMAL
NTRA TRIPLOIDY RESULT TEXT: NORMAL
NTRA TRISOMY 13 AGE-BASED RISK TEXT: NORMAL
NTRA TRISOMY 13 RESULT TEXT: NORMAL
NTRA TRISOMY 13 RISK SCORE TEXT: NORMAL
NTRA TRISOMY 18 AGE-BASED RISK TEXT: NORMAL
NTRA TRISOMY 18 RESULT TEXT: NORMAL
NTRA TRISOMY 18 RISK SCORE TEXT: NORMAL
NTRA TRISOMY 21 AGE-BASED RISK TEXT: NORMAL
NTRA TRISOMY 21 RESULT TEXT: NORMAL
NTRA TRISOMY 21 RISK SCORE TEXT: NORMAL

## 2024-08-05 ENCOUNTER — TELEPHONE (OUTPATIENT)
Dept: OBGYN | Facility: CLINIC | Age: 24
End: 2024-08-05

## 2024-08-05 NOTE — TELEPHONE ENCOUNTER
Caller Name: Fabiola Alexis  Call Back Number: [phone]    How would the patient prefer to be contacted with a response: [employer] message or phone call    Pt called and had some medication questions. She states she is having really bad allergies and is wanting to make sure it's safe to take Claritin while on her Reglan medication. I consulted with Skip DU and she said it was safe to take both medications. I informed patient and she was thankful with no other questions or concerns.

## 2024-08-19 ENCOUNTER — PATIENT MESSAGE (OUTPATIENT)
Dept: OBGYN | Facility: CLINIC | Age: 24
End: 2024-08-19

## 2024-09-03 ENCOUNTER — OFFICE VISIT (OUTPATIENT)
Dept: URGENT CARE | Facility: PHYSICIAN GROUP | Age: 24
End: 2024-09-03

## 2024-09-03 ENCOUNTER — HOSPITAL ENCOUNTER (OUTPATIENT)
Facility: MEDICAL CENTER | Age: 24
End: 2024-09-03
Attending: FAMILY MEDICINE

## 2024-09-03 VITALS
BODY MASS INDEX: 30.39 KG/M2 | HEART RATE: 74 BPM | HEIGHT: 64 IN | SYSTOLIC BLOOD PRESSURE: 132 MMHG | TEMPERATURE: 97.7 F | RESPIRATION RATE: 16 BRPM | OXYGEN SATURATION: 98 % | WEIGHT: 178 LBS | DIASTOLIC BLOOD PRESSURE: 80 MMHG

## 2024-09-03 DIAGNOSIS — N30.00 ACUTE CYSTITIS WITHOUT HEMATURIA: ICD-10-CM

## 2024-09-03 LAB
APPEARANCE UR: CLEAR
BILIRUB UR STRIP-MCNC: NORMAL MG/DL
COLOR UR AUTO: YELLOW
GLUCOSE UR STRIP.AUTO-MCNC: NORMAL MG/DL
KETONES UR STRIP.AUTO-MCNC: NORMAL MG/DL
LEUKOCYTE ESTERASE UR QL STRIP.AUTO: NORMAL
NITRITE UR QL STRIP.AUTO: NORMAL
PH UR STRIP.AUTO: 6 [PH] (ref 5–8)
PROT UR QL STRIP: NORMAL MG/DL
RBC UR QL AUTO: NORMAL
SP GR UR STRIP.AUTO: >=1.03
UROBILINOGEN UR STRIP-MCNC: 0.2 MG/DL

## 2024-09-03 PROCEDURE — 87086 URINE CULTURE/COLONY COUNT: CPT

## 2024-09-03 PROCEDURE — 81002 URINALYSIS NONAUTO W/O SCOPE: CPT | Performed by: FAMILY MEDICINE

## 2024-09-03 PROCEDURE — 3075F SYST BP GE 130 - 139MM HG: CPT | Performed by: FAMILY MEDICINE

## 2024-09-03 PROCEDURE — 99213 OFFICE O/P EST LOW 20 MIN: CPT | Performed by: FAMILY MEDICINE

## 2024-09-03 PROCEDURE — 3079F DIAST BP 80-89 MM HG: CPT | Performed by: FAMILY MEDICINE

## 2024-09-03 RX ORDER — SULFAMETHOXAZOLE/TRIMETHOPRIM 800-160 MG
1 TABLET ORAL 2 TIMES DAILY
Qty: 14 TABLET | Refills: 0 | Status: SHIPPED | OUTPATIENT
Start: 2024-09-03 | End: 2024-09-10

## 2024-09-03 ASSESSMENT — ENCOUNTER SYMPTOMS
FEVER: 0
FLANK PAIN: 0

## 2024-09-03 NOTE — PROGRESS NOTES
"Subjective:     Fabiola Alexis is a 24 y.o. female who presents for Follow-Up (UTI, was seen in [place] 8/13. Pt states she took all the antibiotic. Still having frequency ) and Urinary Frequency    HPI  Pt presents for evaluation of an acute problem  Pt was at [facility] ER and received IV abx for about 24 hours prior to discharge   Given Keflex on discharge for 7 days   Has finished all abx   Mostly resolved, but symptoms returning again the past 2 days   Having urinary frequency and urgency  No back pain/flank pain  No abdominal pain  No fevers  Pt currently pregnant, about 20 weeks     Review of Systems   Constitutional:  Negative for fever.   Genitourinary:  Positive for frequency and urgency. Negative for flank pain.       PMH:  has a past medical history of Asthma and Depression.  MEDS:   Current Outpatient Medications:     sulfamethoxazole-trimethoprim (BACTRIM DS) 800-160 MG tablet, Take 1 Tablet by mouth 2 times a day for 7 days., Disp: 14 Tablet, Rfl: 0    metoclopramide (REGLAN) 10 MG Tab, Take 1 Tablet by mouth 4 times a day., Disp: 120 Tablet, Rfl: 1  ALLERGIES: No Known Allergies  SURGHX:   Past Surgical History:   Procedure Laterality Date    EYE SURGERY       SOCHX:  reports that she has never smoked. She has never used smokeless tobacco. She reports that she does not currently use alcohol. She reports that she does not currently use drugs after having used the following drugs: Marijuana.     Objective:   /80   Pulse 74   Temp 36.5 °C (97.7 °F) (Temporal)   Resp 16   Ht 1.626 m (5' 4\")   Wt 80.7 kg (178 lb)   LMP 04/14/2024   SpO2 98%   BMI 30.55 kg/m²     Physical Exam  Constitutional:       General: She is not in acute distress.     Appearance: She is well-developed. She is not diaphoretic.   Pulmonary:      Effort: Pulmonary effort is normal.   Abdominal:      General: Abdomen is flat. There is no distension.      Palpations: Abdomen is soft.      Tenderness: There is no right " CVA tenderness, left CVA tenderness, guarding or rebound.      Comments: Mild tenderness in lower quadrants   Neurological:      Mental Status: She is alert.         Assessment/Plan:   Assessment    1. Acute cystitis without hematuria  - POCT Urinalysis  - URINE CULTURE(NEW); Future  - sulfamethoxazole-trimethoprim (BACTRIM DS) 800-160 MG tablet; Take 1 Tablet by mouth 2 times a day for 7 days.  Dispense: 14 Tablet; Refill: 0    Patient with acute cystitis.  Previously treated with Keflex and symptoms have now recurred.  Unfortunately, prior testing and treatment was done at outside hospital and do not have records available.  Unsure what sensitivities were from urine culture, or even if there was a urine culture done.  Recommended sending new urine culture.  Point-of-care urine in office today is within normal limits, but advised that this is not always reliable since she was just on antibiotics.  And especially because she is pregnant, recommended treating cautiously rather than waiting for culture results prior to initiating treatment.  Start Bactrim and follow-up new culture results.

## 2024-09-04 ENCOUNTER — ANCILLARY PROCEDURE (OUTPATIENT)
Dept: OBGYN | Facility: CLINIC | Age: 24
End: 2024-09-04
Attending: MIDWIFE

## 2024-09-04 VITALS — BODY MASS INDEX: 30.42 KG/M2 | SYSTOLIC BLOOD PRESSURE: 124 MMHG | DIASTOLIC BLOOD PRESSURE: 80 MMHG | WEIGHT: 177.2 LBS

## 2024-09-04 DIAGNOSIS — N30.00 ACUTE CYSTITIS WITHOUT HEMATURIA: ICD-10-CM

## 2024-09-04 DIAGNOSIS — Z34.00 SUPERVISION OF NORMAL FIRST PREGNANCY, ANTEPARTUM: ICD-10-CM

## 2024-09-04 PROCEDURE — 76805 OB US >/= 14 WKS SNGL FETUS: CPT | Performed by: OBSTETRICS & GYNECOLOGY

## 2024-09-06 ENCOUNTER — ROUTINE PRENATAL (OUTPATIENT)
Dept: OBGYN | Facility: CLINIC | Age: 24
End: 2024-09-06

## 2024-09-06 VITALS — BODY MASS INDEX: 30.48 KG/M2 | DIASTOLIC BLOOD PRESSURE: 56 MMHG | WEIGHT: 177.6 LBS | SYSTOLIC BLOOD PRESSURE: 112 MMHG

## 2024-09-06 DIAGNOSIS — Z34.02 PRENATAL CARE, FIRST PREGNANCY IN SECOND TRIMESTER: Primary | ICD-10-CM

## 2024-09-06 DIAGNOSIS — Z34.00 SUPERVISION OF NORMAL FIRST PREGNANCY, ANTEPARTUM: ICD-10-CM

## 2024-09-06 LAB
BACTERIA UR CULT: NORMAL
SIGNIFICANT IND 70042: NORMAL
SITE SITE: NORMAL
SOURCE SOURCE: NORMAL

## 2024-09-06 PROCEDURE — 3078F DIAST BP <80 MM HG: CPT

## 2024-09-06 PROCEDURE — 0502F SUBSEQUENT PRENATAL CARE: CPT

## 2024-09-06 PROCEDURE — 3074F SYST BP LT 130 MM HG: CPT

## 2024-09-06 NOTE — PROGRESS NOTES
Pt here today for OBFV   +FM movemnet  No VB, LOF. 's   Phone number [phone] (home)   Pharmacy verified     
S: Pt is a 24 y.o.  at 20w5d gestation here today for routine prenatal care.     Concerns today include:  Denies concerns, pt reports mood are better and denies depression or anxiety.  Pt states she is seeing a therapist in [place].  Pt reports nausea and vomiting is much better.  She is only taking reglan 1-2 times per day. Pt states she has an appetite now and is eating and drinking on a regular basis during the day.     Denies: vaginal bleeding, pelvic and abdominal pain, cramping, contractions, leaking of fluid, urinary and vaginal symptoms and headaches, visual changes, epigastric pain    Pt reports fetal movement as Present     O: /56   Wt 177 lb 9.6 oz   LMP 2024   BMI 30.48 kg/m²    *see prenatal flowsheet*     Labs:     Prenatal labs: Completed and normal  GCT: n/a   GBS: Not yet collected  Genetic testing: low risk  STI testing: negative    Ultrasound: efw 44%, EIF. Recommend growth f/u in 28/32 weeks    A/P:     Problem List Items Addressed This Visit          Women's Health Problems    Supervision of normal first pregnancy, antepartum     Pt is a 24 y.o.  at 20w5d gestation here today for routine prenatal care.   Size equal to dates    Discuss 2nd trimester warning signs  Address concerns: no concerns   NIPT performed earlier this pregnancy, low risk.  Anatomy scan reviewed. Findings: efw 44%, EIF. Repeat US needed and ordered 28-32weeks.  Encouraged patient to start weaning off Reglan and only take it as needed per nausea and vomiting.    RTC 4 wks           Other Visit Diagnoses       Prenatal care, first pregnancy in second trimester    -  Primary    Relevant Orders    US-OB LIMITED GROWTH FOLLOW UP             
no

## 2024-09-06 NOTE — ASSESSMENT & PLAN NOTE
Pt is a 24 y.o.  at 20w5d gestation here today for routine prenatal care.   Size equal to dates    Discuss 2nd trimester warning signs  Address concerns: no concerns   NIPT performed earlier this pregnancy, low risk.  Anatomy scan reviewed. Findings: efw 44%, EIF. Repeat US needed and ordered 28-32weeks.  Encouraged patient to start weaning off Reglan and only take it as needed per nausea and vomiting.    RTC 4 wks

## 2024-09-17 ENCOUNTER — APPOINTMENT (OUTPATIENT)
Dept: INTERNAL MEDICINE | Facility: OTHER | Age: 24
End: 2024-09-17
Payer: MEDICAID

## 2024-10-04 ENCOUNTER — ROUTINE PRENATAL (OUTPATIENT)
Dept: OBGYN | Facility: CLINIC | Age: 24
End: 2024-10-04

## 2024-10-04 VITALS — DIASTOLIC BLOOD PRESSURE: 64 MMHG | BODY MASS INDEX: 31.24 KG/M2 | WEIGHT: 182 LBS | SYSTOLIC BLOOD PRESSURE: 104 MMHG

## 2024-10-04 DIAGNOSIS — Z34.00 SUPERVISION OF NORMAL FIRST PREGNANCY, ANTEPARTUM: ICD-10-CM

## 2024-10-04 DIAGNOSIS — R45.86 MOOD CHANGES: ICD-10-CM

## 2024-10-04 PROCEDURE — 3074F SYST BP LT 130 MM HG: CPT | Performed by: NURSE PRACTITIONER

## 2024-10-04 PROCEDURE — 3078F DIAST BP <80 MM HG: CPT | Performed by: NURSE PRACTITIONER

## 2024-10-04 PROCEDURE — 0502F SUBSEQUENT PRENATAL CARE: CPT | Performed by: NURSE PRACTITIONER

## 2024-10-07 ENCOUNTER — TELEPHONE (OUTPATIENT)
Dept: OBGYN | Facility: CLINIC | Age: 24
End: 2024-10-07
Payer: MEDICAID

## 2024-10-09 ENCOUNTER — PATIENT MESSAGE (OUTPATIENT)
Dept: OBGYN | Facility: CLINIC | Age: 24
End: 2024-10-09
Payer: MEDICAID

## 2024-10-09 RX ORDER — FAMOTIDINE 40 MG/1
40 TABLET, FILM COATED ORAL DAILY
Qty: 60 TABLET | Refills: 0 | Status: SHIPPED | OUTPATIENT
Start: 2024-10-09

## 2024-10-21 ENCOUNTER — APPOINTMENT (OUTPATIENT)
Dept: INTERNAL MEDICINE | Facility: OTHER | Age: 24
End: 2024-10-21
Payer: MEDICAID

## 2024-10-24 ENCOUNTER — OFFICE VISIT (OUTPATIENT)
Dept: URGENT CARE | Facility: PHYSICIAN GROUP | Age: 24
End: 2024-10-24
Payer: MEDICAID

## 2024-10-24 VITALS
OXYGEN SATURATION: 94 % | WEIGHT: 185.5 LBS | RESPIRATION RATE: 18 BRPM | HEART RATE: 96 BPM | HEIGHT: 64 IN | TEMPERATURE: 97.6 F | BODY MASS INDEX: 31.67 KG/M2 | DIASTOLIC BLOOD PRESSURE: 68 MMHG | SYSTOLIC BLOOD PRESSURE: 128 MMHG

## 2024-10-24 DIAGNOSIS — J06.9 URI WITH COUGH AND CONGESTION: ICD-10-CM

## 2024-10-24 LAB
FLUAV RNA SPEC QL NAA+PROBE: NEGATIVE
FLUBV RNA SPEC QL NAA+PROBE: NEGATIVE
RSV RNA SPEC QL NAA+PROBE: NEGATIVE
SARS-COV-2 RNA RESP QL NAA+PROBE: NEGATIVE

## 2024-10-24 PROCEDURE — 87637 SARSCOV2&INF A&B&RSV AMP PRB: CPT | Mod: QW | Performed by: NURSE PRACTITIONER

## 2024-10-24 PROCEDURE — 99213 OFFICE O/P EST LOW 20 MIN: CPT | Performed by: NURSE PRACTITIONER

## 2024-10-24 PROCEDURE — 3078F DIAST BP <80 MM HG: CPT | Performed by: NURSE PRACTITIONER

## 2024-10-24 PROCEDURE — 3074F SYST BP LT 130 MM HG: CPT | Performed by: NURSE PRACTITIONER

## 2024-10-24 RX ORDER — FLUTICASONE PROPIONATE 50 MCG
1 SPRAY, SUSPENSION (ML) NASAL DAILY
Qty: 16 G | Refills: 0 | Status: SHIPPED | OUTPATIENT
Start: 2024-10-24

## 2024-10-24 RX ORDER — ALBUTEROL SULFATE 90 UG/1
2 INHALANT RESPIRATORY (INHALATION) EVERY 6 HOURS PRN
COMMUNITY

## 2024-10-24 RX ORDER — PREDNISONE 20 MG/1
20 TABLET ORAL DAILY
Qty: 5 TABLET | Refills: 0 | Status: SHIPPED | OUTPATIENT
Start: 2024-10-24 | End: 2024-10-29

## 2024-10-28 ENCOUNTER — TELEPHONE (OUTPATIENT)
Dept: OBGYN | Facility: CLINIC | Age: 24
End: 2024-10-28
Payer: MEDICAID

## 2024-10-28 ENCOUNTER — HOSPITAL ENCOUNTER (OUTPATIENT)
Dept: LAB | Facility: MEDICAL CENTER | Age: 24
End: 2024-10-28
Attending: NURSE PRACTITIONER

## 2024-10-28 DIAGNOSIS — Z34.00 SUPERVISION OF NORMAL FIRST PREGNANCY, ANTEPARTUM: ICD-10-CM

## 2024-10-29 ENCOUNTER — HOSPITAL ENCOUNTER (OUTPATIENT)
Dept: LAB | Facility: MEDICAL CENTER | Age: 24
End: 2024-10-29
Payer: MEDICAID

## 2024-10-29 DIAGNOSIS — Z34.00 SUPERVISION OF NORMAL FIRST PREGNANCY, ANTEPARTUM: ICD-10-CM

## 2024-10-29 LAB
ERYTHROCYTE [DISTWIDTH] IN BLOOD BY AUTOMATED COUNT: 38.9 FL (ref 35.9–50)
GLUCOSE 1H P 50 G GLC PO SERPL-MCNC: 116 MG/DL (ref 70–139)
HCT VFR BLD AUTO: 34.6 % (ref 37–47)
HGB BLD-MCNC: 12.4 G/DL (ref 12–16)
MCH RBC QN AUTO: 32.9 PG (ref 27–33)
MCHC RBC AUTO-ENTMCNC: 35.8 G/DL (ref 32.2–35.5)
MCV RBC AUTO: 91.8 FL (ref 81.4–97.8)
PLATELET # BLD AUTO: 262 K/UL (ref 164–446)
PMV BLD AUTO: 10.3 FL (ref 9–12.9)
RBC # BLD AUTO: 3.77 M/UL (ref 4.2–5.4)
T PALLIDUM AB SER QL IA: NORMAL
WBC # BLD AUTO: 13.8 K/UL (ref 4.8–10.8)

## 2024-10-29 PROCEDURE — 82950 GLUCOSE TEST: CPT

## 2024-10-29 PROCEDURE — 85027 COMPLETE CBC AUTOMATED: CPT

## 2024-10-29 PROCEDURE — 86780 TREPONEMA PALLIDUM: CPT

## 2024-10-29 PROCEDURE — 36415 COLL VENOUS BLD VENIPUNCTURE: CPT

## 2024-10-31 ENCOUNTER — ROUTINE PRENATAL (OUTPATIENT)
Dept: OBGYN | Facility: CLINIC | Age: 24
End: 2024-10-31
Payer: MEDICAID

## 2024-10-31 VITALS — BODY MASS INDEX: 32.06 KG/M2 | WEIGHT: 186.8 LBS | SYSTOLIC BLOOD PRESSURE: 122 MMHG | DIASTOLIC BLOOD PRESSURE: 68 MMHG

## 2024-10-31 DIAGNOSIS — Z34.00 SUPERVISION OF NORMAL FIRST PREGNANCY, ANTEPARTUM: ICD-10-CM

## 2024-11-06 ENCOUNTER — TELEPHONE (OUTPATIENT)
Dept: OBGYN | Facility: CLINIC | Age: 24
End: 2024-11-06
Payer: MEDICAID

## 2024-11-06 NOTE — TELEPHONE ENCOUNTER
Pt called in with concerns RE abdominal aching and increased FM.  Pt states she can feel her baby rolling around and is experiencing pain/ aching in her abdomen and back. Pt informed that these aches are common as baby grows.   Pt denies any vaginal bleeding, LOF, or discharge. Pos FM.   Pt advised she can take Tylenol for the aches, warm compressions, and encouraged to use a belly band.    Pt verbalized understanding.

## 2024-11-08 ENCOUNTER — APPOINTMENT (OUTPATIENT)
Dept: OBGYN | Facility: CLINIC | Age: 24
End: 2024-11-08
Payer: MEDICAID

## 2024-11-08 VITALS — HEART RATE: 91 BPM | SYSTOLIC BLOOD PRESSURE: 131 MMHG | DIASTOLIC BLOOD PRESSURE: 80 MMHG

## 2024-11-08 DIAGNOSIS — Z34.02 PRENATAL CARE, FIRST PREGNANCY IN SECOND TRIMESTER: ICD-10-CM

## 2024-11-08 PROCEDURE — 76816 OB US FOLLOW-UP PER FETUS: CPT | Performed by: OBSTETRICS & GYNECOLOGY

## 2024-11-14 ENCOUNTER — ROUTINE PRENATAL (OUTPATIENT)
Dept: OBGYN | Facility: CLINIC | Age: 24
End: 2024-11-14
Payer: MEDICAID

## 2024-11-14 VITALS — SYSTOLIC BLOOD PRESSURE: 108 MMHG | DIASTOLIC BLOOD PRESSURE: 60 MMHG | BODY MASS INDEX: 32.61 KG/M2 | WEIGHT: 190 LBS

## 2024-11-14 DIAGNOSIS — Z34.00 SUPERVISION OF NORMAL FIRST PREGNANCY, ANTEPARTUM: ICD-10-CM

## 2024-11-14 PROCEDURE — 0502F SUBSEQUENT PRENATAL CARE: CPT | Performed by: MIDWIFE

## 2024-11-14 PROCEDURE — 3078F DIAST BP <80 MM HG: CPT | Performed by: MIDWIFE

## 2024-11-14 PROCEDURE — 3074F SYST BP LT 130 MM HG: CPT | Performed by: MIDWIFE

## 2024-11-14 NOTE — LETTER
2024      Fabiola Alexis is currently pregnant and being cared for by [facility].     She is medically cleared for:    Dental exams and routine cleaning  Tooth fillings and extractions as needed  Antibiotic therapy as appropriate  Local anesthesia  Xray with abdominal shield     Patient may be administered the followin% Lidocaine with 1:100,000 Epinephrine  4% Septocaine with 1:100,000 Epinephrine  Nitrous Oxide  A narcotic or non-narcotic pain medication  An antibiotic such as penicillin or clindamycin    NO TETRACYCLINE and NO CODEINE, NO IBUPROFEN         Thank you,          ZENA SalasNARTEM.    Electronically Signed

## 2024-11-14 NOTE — PROGRESS NOTES
Pt here today for OB follow up  Pt states no complaints  Reports +FM  Labs done  Good # [phone] (home)    Pharmacy Confirmed.  Chaperone offered and declined.

## 2024-11-14 NOTE — PROGRESS NOTES
S: Pt is a 24 y.o.  at 30w4d gestation here today for routine prenatal care.     Concerns today include: Reports upper abdominal pain and lower back pain last night which resolved with passing gas. Denies continued abdominal pain, n/v/d, fever and chills.     Denies: vaginal bleeding, pelvic and abdominal pain, cramping, contractions, leaking of fluid, urinary and vaginal symptoms    Pt reports fetal movement as Present     O: /60   Wt 190 lb   LMP 2024   BMI 32.61 kg/m²    *see prenatal flowsheet*     Labs:     Prenatal labs: Completed and normal  GCT: 112   GBS: Not yet collected  Genetic testing: NIPT low risk  STI testing: negative    Ultrasound: 24    Cardiac activity present.  bpm. Fetal movements: visualized. Presentation: cephalic  Placenta: posterior  Umbilical cord: Cord vessels: 3 vessel cord, Insertion site: normal insertion  Amniotic fluid: MVP 6.6 cm. LEOBARDO 18.6 cm  Fetal Biometry  ===============     Standard  BPD79.7 mm32w 0d 94% Hadlock  OFD97.4 mm31w 3d 89% Juan  .3 mm30w 6d 48% Hadlock  .4 mm29w 6d 50% Hadlock  Femur57.0 mm29w 6d 40% Hadlock  Humerus52.6 mm30w 4d 74% Juan  HC / AC1.09  EFW1,519 g29w 5d 52% Hadlock  EFW (lb)3 lb  EFW (oz)6 oz    A/P:     Problem List Items Addressed This Visit          Unprioritized    Supervision of normal first pregnancy, antepartum     Pt is a 24 y.o.  at 30w4d gestation here today for routine prenatal care.   Size equal to dates    Discuss  labor and pre-eclampsia precautions.  Discuss FMA and FKCs  Address concerns: Discuss normalcy of trapped gas in pregnancy and how to relieve. Discuss warning signs that can accompany abdominal pain and when to go to OB ED  GBS Not yet collected  Rh positive  Tdap: Declines. Desires Tdap and RSV next visit  Reviewed 3rd tri labs: normal  Labs ordered: none  Imaging ordered: none  RTC 2 wks.

## 2024-11-14 NOTE — ASSESSMENT & PLAN NOTE
Pt is a 24 y.o.  at 30w4d gestation here today for routine prenatal care.   Size equal to dates    Discuss  labor and pre-eclampsia precautions.  Discuss FMA and FKCs  Address concerns: Discuss normalcy of trapped gas in pregnancy and how to relieve. Discuss warning signs that can accompany abdominal pain and when to go to OB ED  GBS Not yet collected  Rh positive  Tdap: Declines. Desires Tdap and RSV next visit  Reviewed 3rd tri labs: normal  Labs ordered: none  Imaging ordered: none  RTC 2 wks.

## 2024-11-27 ENCOUNTER — ROUTINE PRENATAL (OUTPATIENT)
Dept: OBGYN | Facility: CLINIC | Age: 24
End: 2024-11-27
Payer: MEDICAID

## 2024-11-27 VITALS — SYSTOLIC BLOOD PRESSURE: 124 MMHG | BODY MASS INDEX: 33.71 KG/M2 | WEIGHT: 196.4 LBS | DIASTOLIC BLOOD PRESSURE: 68 MMHG

## 2024-11-27 DIAGNOSIS — Z34.00 SUPERVISION OF NORMAL FIRST PREGNANCY, ANTEPARTUM: ICD-10-CM

## 2024-11-27 PROCEDURE — 3074F SYST BP LT 130 MM HG: CPT | Performed by: NURSE PRACTITIONER

## 2024-11-27 PROCEDURE — 3078F DIAST BP <80 MM HG: CPT | Performed by: NURSE PRACTITIONER

## 2024-11-27 PROCEDURE — 0502F SUBSEQUENT PRENATAL CARE: CPT | Performed by: NURSE PRACTITIONER

## 2024-11-27 NOTE — PROGRESS NOTES
Pt. Here for OB/FU.   Reports Good FM.   Chaperone offered and indicated.   Tdap and RSV offered and pt would like them at her next visit.   BTL offered again and pt still not sure.   Pt states she has no concerns.   Phone/Pharm verified.    [phone]

## 2024-12-11 ENCOUNTER — ROUTINE PRENATAL (OUTPATIENT)
Dept: OBGYN | Facility: CLINIC | Age: 24
End: 2024-12-11
Payer: MEDICAID

## 2024-12-11 VITALS — WEIGHT: 184 LBS | BODY MASS INDEX: 31.58 KG/M2 | SYSTOLIC BLOOD PRESSURE: 102 MMHG | DIASTOLIC BLOOD PRESSURE: 58 MMHG

## 2024-12-11 DIAGNOSIS — Z34.00 SUPERVISION OF NORMAL FIRST PREGNANCY, ANTEPARTUM: ICD-10-CM

## 2024-12-11 PROCEDURE — 3074F SYST BP LT 130 MM HG: CPT | Performed by: NURSE PRACTITIONER

## 2024-12-11 PROCEDURE — 0502F SUBSEQUENT PRENATAL CARE: CPT | Performed by: NURSE PRACTITIONER

## 2024-12-11 PROCEDURE — 3078F DIAST BP <80 MM HG: CPT | Performed by: NURSE PRACTITIONER

## 2024-12-11 NOTE — PROGRESS NOTES
OB follow up   + fetal movement.  No VB, LOF or UC's.  Phone # [phone]  Preferred pharmacy confirmed.  Flu vaccine declined before  RSV /TDap next visit due to power outage. Vaccines were moved.

## 2024-12-26 ENCOUNTER — HOSPITAL ENCOUNTER (OUTPATIENT)
Facility: MEDICAL CENTER | Age: 24
End: 2024-12-26
Attending: NURSE PRACTITIONER
Payer: MEDICAID

## 2024-12-26 ENCOUNTER — ROUTINE PRENATAL (OUTPATIENT)
Dept: OBGYN | Facility: CLINIC | Age: 24
End: 2024-12-26
Payer: MEDICAID

## 2024-12-26 VITALS — BODY MASS INDEX: 34.67 KG/M2 | WEIGHT: 202 LBS | SYSTOLIC BLOOD PRESSURE: 114 MMHG | DIASTOLIC BLOOD PRESSURE: 68 MMHG

## 2024-12-26 DIAGNOSIS — Z34.00 SUPERVISION OF NORMAL FIRST PREGNANCY, ANTEPARTUM: ICD-10-CM

## 2024-12-26 PROCEDURE — 3078F DIAST BP <80 MM HG: CPT | Performed by: NURSE PRACTITIONER

## 2024-12-26 PROCEDURE — 87150 DNA/RNA AMPLIFIED PROBE: CPT

## 2024-12-26 PROCEDURE — 90715 TDAP VACCINE 7 YRS/> IM: CPT | Mod: JZ

## 2024-12-26 PROCEDURE — 90471 IMMUNIZATION ADMIN: CPT

## 2024-12-26 PROCEDURE — 0502F SUBSEQUENT PRENATAL CARE: CPT | Performed by: NURSE PRACTITIONER

## 2024-12-26 PROCEDURE — 3074F SYST BP LT 130 MM HG: CPT | Performed by: NURSE PRACTITIONER

## 2024-12-26 PROCEDURE — 87081 CULTURE SCREEN ONLY: CPT

## 2024-12-26 NOTE — PROGRESS NOTES
Pt. Here for OB/FU. Reports Good FM.   Good #  Pt. Denies VB, LOF, or UC's.   Pharmacy verified.   Chaperone offered and not indicated      Needs GBS today

## 2024-12-28 LAB — GP B STREP DNA SPEC QL NAA+PROBE: NEGATIVE

## 2025-01-02 ENCOUNTER — APPOINTMENT (OUTPATIENT)
Dept: OBGYN | Facility: CLINIC | Age: 25
End: 2025-01-02
Payer: MEDICAID

## 2025-01-02 VITALS — SYSTOLIC BLOOD PRESSURE: 122 MMHG | WEIGHT: 204 LBS | BODY MASS INDEX: 35.02 KG/M2 | DIASTOLIC BLOOD PRESSURE: 64 MMHG

## 2025-01-02 DIAGNOSIS — Z34.00 SUPERVISION OF NORMAL FIRST PREGNANCY, ANTEPARTUM: ICD-10-CM

## 2025-01-02 PROCEDURE — 3074F SYST BP LT 130 MM HG: CPT | Performed by: NURSE PRACTITIONER

## 2025-01-02 PROCEDURE — 0502F SUBSEQUENT PRENATAL CARE: CPT | Performed by: NURSE PRACTITIONER

## 2025-01-02 PROCEDURE — 3078F DIAST BP <80 MM HG: CPT | Performed by: NURSE PRACTITIONER

## 2025-01-02 NOTE — PROGRESS NOTES
Pt here today for OB follow up  Pt states no complaints   Reports +FM  Good # [phone]  Pharmacy Confirmed.  Chaperone offered and not indicated.   GBS negative.

## 2025-01-09 ENCOUNTER — ROUTINE PRENATAL (OUTPATIENT)
Dept: OBGYN | Facility: CLINIC | Age: 25
End: 2025-01-09
Payer: MEDICAID

## 2025-01-09 VITALS — WEIGHT: 204 LBS | SYSTOLIC BLOOD PRESSURE: 130 MMHG | BODY MASS INDEX: 35.02 KG/M2 | DIASTOLIC BLOOD PRESSURE: 70 MMHG

## 2025-01-09 DIAGNOSIS — Z34.00 SUPERVISION OF NORMAL FIRST PREGNANCY, ANTEPARTUM: Primary | ICD-10-CM

## 2025-01-09 PROCEDURE — 0502F SUBSEQUENT PRENATAL CARE: CPT

## 2025-01-09 PROCEDURE — 3075F SYST BP GE 130 - 139MM HG: CPT

## 2025-01-09 PROCEDURE — 3078F DIAST BP <80 MM HG: CPT

## 2025-01-09 RX ORDER — BREAST PUMP
1 EACH MISCELLANEOUS DAILY
Qty: 1 EACH | Refills: 0 | Status: SHIPPED | OUTPATIENT
Start: 2025-01-09

## 2025-01-09 NOTE — PROGRESS NOTES
Pt here today for OBFV   +FM movemnet  No VB, LOF. 's   Phone number [phone] (home)   Pharmacy verified   Pt would like a breast pump order   Cramping for the past week on and off

## 2025-01-10 NOTE — PROGRESS NOTES
S: Pt is a 24 y.o.  at 38w5d gestation here today for routine prenatal care.     Concerns today include:  Pt has concerns about possible induction.    Denies: vaginal bleeding, pelvic and abdominal pain, cramping, contractions, leaking of fluid, urinary and vaginal symptoms and headaches, visual changes, epigastric pain    Pt reports fetal movement as Present     O: /70   Wt 204 lb   LMP 2024   BMI 35.02 kg/m²    *see prenatal flowsheet*     Labs:     Prenatal labs: Completed and normal  GCT: 116   GBS: Negative.  Genetic testing: NIPT low risk  STI testing: negative    Ultrasound: : ac 50%, efw 52%, richie 18.6 cm    A/P:     Problem List Items Addressed This Visit       Supervision of normal first pregnancy, antepartum - Primary     Pt is a 24 y.o.  at 38w5d gestation here today for routine prenatal care.   Size equal to dates      Discuss Labor and pre-eclampsia precautions.  Discuss FMA and FKCs  Address concerns: discussed IOL at 40-41 weeks  GBS Negative.  Rh positive  Tdap: Administered   Reviewed 3rd tri labs: yes  Discussed options for contraception postpartum. Pt undecided at this time.  RTC 1 wks.

## 2025-01-10 NOTE — ASSESSMENT & PLAN NOTE
Pt is a 24 y.o.  at 38w5d gestation here today for routine prenatal care.   Size equal to dates      Discuss Labor and pre-eclampsia precautions.  Discuss FMA and FKCs  Address concerns: discussed IOL at 40-41 weeks  GBS Negative.  Rh positive  Tdap: Administered   Reviewed 3rd tri labs: yes  Discussed options for contraception postpartum. Pt undecided at this time.  RTC 1 wks.

## 2025-01-16 ENCOUNTER — ROUTINE PRENATAL (OUTPATIENT)
Dept: OBGYN | Facility: CLINIC | Age: 25
End: 2025-01-16
Payer: MEDICAID

## 2025-01-16 VITALS — WEIGHT: 207 LBS | DIASTOLIC BLOOD PRESSURE: 80 MMHG | SYSTOLIC BLOOD PRESSURE: 122 MMHG | BODY MASS INDEX: 35.53 KG/M2

## 2025-01-16 DIAGNOSIS — Z34.00 SUPERVISION OF NORMAL FIRST PREGNANCY, ANTEPARTUM: Primary | ICD-10-CM

## 2025-01-16 PROCEDURE — 0502F SUBSEQUENT PRENATAL CARE: CPT

## 2025-01-16 PROCEDURE — 3074F SYST BP LT 130 MM HG: CPT

## 2025-01-16 PROCEDURE — 3079F DIAST BP 80-89 MM HG: CPT

## 2025-01-16 RX ORDER — ALBUTEROL SULFATE 90 UG/1
2 INHALANT RESPIRATORY (INHALATION) EVERY 6 HOURS PRN
Qty: 8.5 G | Refills: 0 | Status: SHIPPED | OUTPATIENT
Start: 2025-01-16

## 2025-01-16 NOTE — PROGRESS NOTES
Pt here today for OBFV   +FM movemnet  No VB, LOF. 's   Phone number [phone] (home)   Pharmacy verified   GBS- neg   Cervical check- yes

## 2025-01-17 NOTE — ASSESSMENT & PLAN NOTE
Pt is a 24 y.o.  at 39w4d gestation here today for routine prenatal care.   Size equal to dates  IOL ordered    Discuss Labor and pre-eclampsia precautions.  Discuss FMA and FKCs  Address concerns: labor precautions discussed  GBS Negative.  Rh positive  Tdap: Administered   Reviewed 3rd tri labs: yes  RTC 1 wks.   IOL order placed

## 2025-01-17 NOTE — PROGRESS NOTES
S: Pt is a 24 y.o.  at 39w4d gestation here today for routine prenatal care.     Concerns today include:  cramping and uterine contractions    Denies: vaginal bleeding, pelvic and abdominal pain, cramping, contractions, leaking of fluid, urinary and vaginal symptoms and headaches, visual changes, epigastric pain    Pt reports fetal movement as Present     O: /80   Wt 207 lb   LMP 2024   BMI 35.53 kg/m²    *see prenatal flowsheet*     Labs:     Prenatal labs: Completed and normal  GCT: 116   GBS: Negative.  Genetic testing: NIPT low risk  STI testing: negative    Ultrasound: : ac 50%, efw 52%, richie 18.6 cm    A/P:     Problem List Items Addressed This Visit       Supervision of normal first pregnancy, antepartum - Primary     Pt is a 24 y.o.  at 39w4d gestation here today for routine prenatal care.   Size equal to dates  IOL ordered    Discuss Labor and pre-eclampsia precautions.  Discuss FMA and FKCs  Address concerns: labor precautions discussed  GBS Negative.  Rh positive  Tdap: Administered   Reviewed 3rd tri labs: yes  RTC 1 wks.   IOL order placed          Relevant Orders    Induction of Labor and

## 2025-01-21 ENCOUNTER — ANESTHESIA (OUTPATIENT)
Dept: ANESTHESIOLOGY | Facility: MEDICAL CENTER | Age: 25
End: 2025-01-21
Payer: MEDICAID

## 2025-01-21 ENCOUNTER — HOSPITAL ENCOUNTER (INPATIENT)
Facility: MEDICAL CENTER | Age: 25
LOS: 2 days | End: 2025-01-23
Attending: OBSTETRICS & GYNECOLOGY | Admitting: OBSTETRICS & GYNECOLOGY
Payer: MEDICAID

## 2025-01-21 ENCOUNTER — ANESTHESIA EVENT (OUTPATIENT)
Dept: ANESTHESIOLOGY | Facility: MEDICAL CENTER | Age: 25
End: 2025-01-21
Payer: MEDICAID

## 2025-01-21 PROBLEM — O47.9 THREATENED LABOR: Status: ACTIVE | Noted: 2025-01-21

## 2025-01-21 LAB
BASOPHILS # BLD AUTO: 0.3 % (ref 0–1.8)
BASOPHILS # BLD: 0.04 K/UL (ref 0–0.12)
EOSINOPHIL # BLD AUTO: 0.07 K/UL (ref 0–0.51)
EOSINOPHIL NFR BLD: 0.6 % (ref 0–6.9)
ERYTHROCYTE [DISTWIDTH] IN BLOOD BY AUTOMATED COUNT: 39.1 FL (ref 35.9–50)
HCT VFR BLD AUTO: 35.4 % (ref 37–47)
HGB BLD-MCNC: 13 G/DL (ref 12–16)
HOLDING TUBE BB 8507: NORMAL
IMM GRANULOCYTES # BLD AUTO: 0.08 K/UL (ref 0–0.11)
IMM GRANULOCYTES NFR BLD AUTO: 0.7 % (ref 0–0.9)
LYMPHOCYTES # BLD AUTO: 1.93 K/UL (ref 1–4.8)
LYMPHOCYTES NFR BLD: 16.3 % (ref 22–41)
MCH RBC QN AUTO: 32.3 PG (ref 27–33)
MCHC RBC AUTO-ENTMCNC: 36.7 G/DL (ref 32.2–35.5)
MCV RBC AUTO: 87.8 FL (ref 81.4–97.8)
MONOCYTES # BLD AUTO: 0.8 K/UL (ref 0–0.85)
MONOCYTES NFR BLD AUTO: 6.8 % (ref 0–13.4)
NEUTROPHILS # BLD AUTO: 8.93 K/UL (ref 1.82–7.42)
NEUTROPHILS NFR BLD: 75.3 % (ref 44–72)
NRBC # BLD AUTO: 0 K/UL
NRBC BLD-RTO: 0 /100 WBC (ref 0–0.2)
PLATELET # BLD AUTO: 195 K/UL (ref 164–446)
PMV BLD AUTO: 10.6 FL (ref 9–12.9)
RBC # BLD AUTO: 4.03 M/UL (ref 4.2–5.4)
T PALLIDUM AB SER QL IA: NORMAL
WBC # BLD AUTO: 11.9 K/UL (ref 4.8–10.8)

## 2025-01-21 PROCEDURE — 85025 COMPLETE CBC W/AUTO DIFF WBC: CPT

## 2025-01-21 PROCEDURE — 700111 HCHG RX REV CODE 636 W/ 250 OVERRIDE (IP): Performed by: STUDENT IN AN ORGANIZED HEALTH CARE EDUCATION/TRAINING PROGRAM

## 2025-01-21 PROCEDURE — 303615 HCHG EPIDURAL/SPINAL ANESTHESIA FOR LABOR

## 2025-01-21 PROCEDURE — 700105 HCHG RX REV CODE 258: Performed by: OBSTETRICS & GYNECOLOGY

## 2025-01-21 PROCEDURE — 700105 HCHG RX REV CODE 258: Performed by: STUDENT IN AN ORGANIZED HEALTH CARE EDUCATION/TRAINING PROGRAM

## 2025-01-21 PROCEDURE — 59400 OBSTETRICAL CARE: CPT | Performed by: OBSTETRICS & GYNECOLOGY

## 2025-01-21 PROCEDURE — 770002 HCHG ROOM/CARE - OB PRIVATE (112)

## 2025-01-21 PROCEDURE — 304965 HCHG RECOVERY SERVICES

## 2025-01-21 PROCEDURE — 700102 HCHG RX REV CODE 250 W/ 637 OVERRIDE(OP): Performed by: OBSTETRICS & GYNECOLOGY

## 2025-01-21 PROCEDURE — 700101 HCHG RX REV CODE 250: Performed by: STUDENT IN AN ORGANIZED HEALTH CARE EDUCATION/TRAINING PROGRAM

## 2025-01-21 PROCEDURE — A9270 NON-COVERED ITEM OR SERVICE: HCPCS | Performed by: OBSTETRICS & GYNECOLOGY

## 2025-01-21 PROCEDURE — 36415 COLL VENOUS BLD VENIPUNCTURE: CPT

## 2025-01-21 PROCEDURE — 700111 HCHG RX REV CODE 636 W/ 250 OVERRIDE (IP): Mod: JZ | Performed by: OBSTETRICS & GYNECOLOGY

## 2025-01-21 PROCEDURE — 86780 TREPONEMA PALLIDUM: CPT

## 2025-01-21 PROCEDURE — 59409 OBSTETRICAL CARE: CPT

## 2025-01-21 RX ORDER — TERBUTALINE SULFATE 1 MG/ML
0.25 INJECTION, SOLUTION SUBCUTANEOUS
Status: DISCONTINUED | OUTPATIENT
Start: 2025-01-21 | End: 2025-01-21 | Stop reason: HOSPADM

## 2025-01-21 RX ORDER — SODIUM CHLORIDE, SODIUM LACTATE, POTASSIUM CHLORIDE, CALCIUM CHLORIDE 600; 310; 30; 20 MG/100ML; MG/100ML; MG/100ML; MG/100ML
INJECTION, SOLUTION INTRAVENOUS CONTINUOUS
Status: DISCONTINUED | OUTPATIENT
Start: 2025-01-21 | End: 2025-01-21

## 2025-01-21 RX ORDER — EPHEDRINE SULFATE 50 MG/ML
5 INJECTION, SOLUTION INTRAVENOUS
Status: DISCONTINUED | OUTPATIENT
Start: 2025-01-21 | End: 2025-01-21 | Stop reason: HOSPADM

## 2025-01-21 RX ORDER — ACETAMINOPHEN 500 MG
1000 TABLET ORAL EVERY 6 HOURS PRN
Status: DISCONTINUED | OUTPATIENT
Start: 2025-01-21 | End: 2025-01-23 | Stop reason: HOSPADM

## 2025-01-21 RX ORDER — SODIUM CHLORIDE, SODIUM LACTATE, POTASSIUM CHLORIDE, AND CALCIUM CHLORIDE .6; .31; .03; .02 G/100ML; G/100ML; G/100ML; G/100ML
1000 INJECTION, SOLUTION INTRAVENOUS
Status: COMPLETED | OUTPATIENT
Start: 2025-01-21 | End: 2025-01-21

## 2025-01-21 RX ORDER — SODIUM CHLORIDE, SODIUM LACTATE, POTASSIUM CHLORIDE, CALCIUM CHLORIDE 600; 310; 30; 20 MG/100ML; MG/100ML; MG/100ML; MG/100ML
2000 INJECTION, SOLUTION INTRAVENOUS PRN
Status: DISCONTINUED | OUTPATIENT
Start: 2025-01-21 | End: 2025-01-23 | Stop reason: HOSPADM

## 2025-01-21 RX ORDER — LIDOCAINE HYDROCHLORIDE AND EPINEPHRINE 15; 5 MG/ML; UG/ML
INJECTION, SOLUTION EPIDURAL
Status: COMPLETED | OUTPATIENT
Start: 2025-01-21 | End: 2025-01-21

## 2025-01-21 RX ORDER — ROPIVACAINE HYDROCHLORIDE 2 MG/ML
INJECTION, SOLUTION EPIDURAL; INFILTRATION; PERINEURAL CONTINUOUS
Status: DISCONTINUED | OUTPATIENT
Start: 2025-01-21 | End: 2025-01-21

## 2025-01-21 RX ORDER — DOCUSATE SODIUM 100 MG/1
100 CAPSULE, LIQUID FILLED ORAL 2 TIMES DAILY PRN
Status: DISCONTINUED | OUTPATIENT
Start: 2025-01-21 | End: 2025-01-23 | Stop reason: HOSPADM

## 2025-01-21 RX ORDER — SODIUM CHLORIDE, SODIUM LACTATE, POTASSIUM CHLORIDE, AND CALCIUM CHLORIDE .6; .31; .03; .02 G/100ML; G/100ML; G/100ML; G/100ML
250 INJECTION, SOLUTION INTRAVENOUS PRN
Status: DISCONTINUED | OUTPATIENT
Start: 2025-01-21 | End: 2025-01-21 | Stop reason: HOSPADM

## 2025-01-21 RX ORDER — ROPIVACAINE HYDROCHLORIDE 2 MG/ML
INJECTION, SOLUTION EPIDURAL; INFILTRATION
Status: COMPLETED | OUTPATIENT
Start: 2025-01-21 | End: 2025-01-21

## 2025-01-21 RX ORDER — IBUPROFEN 800 MG/1
800 TABLET, FILM COATED ORAL EVERY 8 HOURS PRN
Status: DISCONTINUED | OUTPATIENT
Start: 2025-01-21 | End: 2025-01-23 | Stop reason: HOSPADM

## 2025-01-21 RX ORDER — ACETAMINOPHEN 500 MG
1000 TABLET ORAL
Status: DISCONTINUED | OUTPATIENT
Start: 2025-01-21 | End: 2025-01-21 | Stop reason: HOSPADM

## 2025-01-21 RX ORDER — SIMETHICONE 125 MG
125 TABLET,CHEWABLE ORAL 4 TIMES DAILY PRN
Status: DISCONTINUED | OUTPATIENT
Start: 2025-01-21 | End: 2025-01-23 | Stop reason: HOSPADM

## 2025-01-21 RX ORDER — OXYTOCIN 10 [USP'U]/ML
10 INJECTION, SOLUTION INTRAMUSCULAR; INTRAVENOUS
Status: DISCONTINUED | OUTPATIENT
Start: 2025-01-21 | End: 2025-01-21 | Stop reason: HOSPADM

## 2025-01-21 RX ORDER — LIDOCAINE HYDROCHLORIDE 10 MG/ML
20 INJECTION, SOLUTION INFILTRATION; PERINEURAL
Status: DISCONTINUED | OUTPATIENT
Start: 2025-01-21 | End: 2025-01-21 | Stop reason: HOSPADM

## 2025-01-21 RX ORDER — OXYCODONE HYDROCHLORIDE 5 MG/1
5 TABLET ORAL EVERY 4 HOURS PRN
Status: DISCONTINUED | OUTPATIENT
Start: 2025-01-21 | End: 2025-01-23 | Stop reason: HOSPADM

## 2025-01-21 RX ORDER — IBUPROFEN 800 MG/1
800 TABLET, FILM COATED ORAL
Status: COMPLETED | OUTPATIENT
Start: 2025-01-21 | End: 2025-01-21

## 2025-01-21 RX ADMIN — SODIUM CHLORIDE, POTASSIUM CHLORIDE, SODIUM LACTATE AND CALCIUM CHLORIDE: 600; 310; 30; 20 INJECTION, SOLUTION INTRAVENOUS at 11:21

## 2025-01-21 RX ADMIN — ACETAMINOPHEN 1000 MG: 500 TABLET ORAL at 20:40

## 2025-01-21 RX ADMIN — OXYTOCIN 10 UNITS: 10 INJECTION, SOLUTION INTRAMUSCULAR; INTRAVENOUS at 15:26

## 2025-01-21 RX ADMIN — ROPIVACAINE HYDROCHLORIDE: 2 INJECTION, SOLUTION EPIDURAL; INFILTRATION at 11:04

## 2025-01-21 RX ADMIN — FENTANYL CITRATE 100 MCG: 50 INJECTION, SOLUTION INTRAMUSCULAR; INTRAVENOUS at 08:56

## 2025-01-21 RX ADMIN — IBUPROFEN 800 MG: 800 TABLET, FILM COATED ORAL at 14:43

## 2025-01-21 RX ADMIN — DOCUSATE SODIUM 100 MG: 100 CAPSULE, LIQUID FILLED ORAL at 20:40

## 2025-01-21 RX ADMIN — SIMETHICONE 125 MG: 125 TABLET, CHEWABLE ORAL at 21:27

## 2025-01-21 RX ADMIN — SODIUM CHLORIDE, POTASSIUM CHLORIDE, SODIUM LACTATE AND CALCIUM CHLORIDE 1000 ML: 600; 310; 30; 20 INJECTION, SOLUTION INTRAVENOUS at 10:00

## 2025-01-21 RX ADMIN — LIDOCAINE HYDROCHLORIDE,EPINEPHRINE BITARTRATE 3 ML: 15; .005 INJECTION, SOLUTION EPIDURAL; INFILTRATION; INTRACAUDAL; PERINEURAL at 10:42

## 2025-01-21 RX ADMIN — OXYTOCIN 10 UNITS: 10 INJECTION, SOLUTION INTRAMUSCULAR; INTRAVENOUS at 15:25

## 2025-01-21 RX ADMIN — ROPIVACAINE HYDROCHLORIDE 10 ML: 2 INJECTION EPIDURAL; INFILTRATION; PERINEURAL at 10:42

## 2025-01-21 RX ADMIN — OXYTOCIN 125 ML/HR: 10 INJECTION, SOLUTION INTRAMUSCULAR; INTRAVENOUS at 15:28

## 2025-01-21 SDOH — ECONOMIC STABILITY: TRANSPORTATION INSECURITY
IN THE PAST 12 MONTHS, HAS THE LACK OF TRANSPORTATION KEPT YOU FROM MEDICAL APPOINTMENTS OR FROM GETTING MEDICATIONS?: NO

## 2025-01-21 SDOH — ECONOMIC STABILITY: TRANSPORTATION INSECURITY
IN THE PAST 12 MONTHS, HAS LACK OF RELIABLE TRANSPORTATION KEPT YOU FROM MEDICAL APPOINTMENTS, MEETINGS, WORK OR FROM GETTING THINGS NEEDED FOR DAILY LIVING?: NO

## 2025-01-21 ASSESSMENT — LIFESTYLE VARIABLES
CONSUMPTION TOTAL: INCOMPLETE
TOTAL SCORE: 0
EVER HAD A DRINK FIRST THING IN THE MORNING TO STEADY YOUR NERVES TO GET RID OF A HANGOVER: NO
TOTAL SCORE: 0
HAVE PEOPLE ANNOYED YOU BY CRITICIZING YOUR DRINKING: NO
HAVE YOU EVER FELT YOU SHOULD CUT DOWN ON YOUR DRINKING: NO
ALCOHOL_USE: NO
EVER FELT BAD OR GUILTY ABOUT YOUR DRINKING: NO
TOTAL SCORE: 0
DOES PATIENT WANT TO STOP DRINKING: NO

## 2025-01-21 ASSESSMENT — PATIENT HEALTH QUESTIONNAIRE - PHQ9
2. FEELING DOWN, DEPRESSED, IRRITABLE, OR HOPELESS: NOT AT ALL
SUM OF ALL RESPONSES TO PHQ9 QUESTIONS 1 AND 2: 0
1. LITTLE INTEREST OR PLEASURE IN DOING THINGS: NOT AT ALL

## 2025-01-21 ASSESSMENT — PAIN DESCRIPTION - PAIN TYPE
TYPE: ACUTE PAIN

## 2025-01-21 ASSESSMENT — SOCIAL DETERMINANTS OF HEALTH (SDOH)
WITHIN THE LAST YEAR, HAVE YOU BEEN AFRAID OF YOUR PARTNER OR EX-PARTNER?: NO
WITHIN THE PAST 12 MONTHS, THE FOOD YOU BOUGHT JUST DIDN'T LAST AND YOU DIDN'T HAVE MONEY TO GET MORE: NEVER TRUE
WITHIN THE PAST 12 MONTHS, YOU WORRIED THAT YOUR FOOD WOULD RUN OUT BEFORE YOU GOT THE MONEY TO BUY MORE: NEVER TRUE
WITHIN THE LAST YEAR, HAVE TO BEEN RAPED OR FORCED TO HAVE ANY KIND OF SEXUAL ACTIVITY BY YOUR PARTNER OR EX-PARTNER?: NO
WITHIN THE LAST YEAR, HAVE YOU BEEN KICKED, HIT, SLAPPED, OR OTHERWISE PHYSICALLY HURT BY YOUR PARTNER OR EX-PARTNER?: NO
WITHIN THE LAST YEAR, HAVE YOU BEEN HUMILIATED OR EMOTIONALLY ABUSED IN OTHER WAYS BY YOUR PARTNER OR EX-PARTNER?: NO
IN THE PAST 12 MONTHS, HAS THE ELECTRIC, GAS, OIL, OR WATER COMPANY THREATENED TO SHUT OFF SERVICE IN YOUR HOME?: NO

## 2025-01-21 NOTE — PROGRESS NOTES
"0803: Pt presents to L&D with c/o SROM of clear fluid at 0620 and regular UC. Pt is a .2 weeks gestation. Sterile spec revealed gross pooling of clear fluid. Dr. Escobedo updated, admission order received.  Pt desires an epidural, anesthesia unavailable at this time.  0930: Pt complete and pushing, Pt continues to desire an epidural, anesthesia updated, pt pushing until anesthesia available.   1035: Anesthesia to bedside.  1204: Pt resumed pushing.  1345: , viable male per Dr. Nuñez, infant assigned 8/9 APGARS, infant skin to skin with pt.  1500: Pt states her legs are \"jello\" and is unable to ambulate at this time.  1830: Pt ambulated, voided, cande-care and gown change complete, pt tolerated well.  1900: Report given to Katy PERALTA. Pt in stable condition with a firm fundus and light lochia.  "

## 2025-01-21 NOTE — L&D DELIVERY NOTE
DELIVERY NOTE - Normal Spontaneous Vaginal Delivery    Pt progressed to complete, pushed, and delivered a viable male infant delivered in vertex presentation over intact perineum with apgars of 8 and 9, weight of pending grams with epidural anesthesia. Infant placed on maternal abdomen.      Delayed cord clamping performed. Placenta delivered spontaneously, was 3 vessel and intact.  Cervix and perineum inspected.  IV Pitocin administered.    No significant tears, small hymeneal ring abrasion at 6:00 did not require suture repair    Hemostasis obtained.  EBL 100cc.       There were no complications.  Mother and infant in stable condition in room.

## 2025-01-21 NOTE — ANESTHESIA PREPROCEDURE EVALUATION
Date: 01/21/25  Procedure: Labor Epidural         Relevant Problems   PULMONARY   (positive) Exercise-induced asthma       Physical Exam    Airway   Mallampati: II  TM distance: >3 FB  Neck ROM: full       Cardiovascular - normal exam  Rhythm: regular  Rate: normal  (-) murmur     Dental - normal exam           Pulmonary - normal exam  Breath sounds clear to auscultation     Abdominal    Neurological - normal exam                   Anesthesia Plan    ASA 2       Plan - epidural   Neuraxial block will be labor analgesia                  Pertinent diagnostic labs and testing reviewed    Informed Consent:    Anesthetic plan and risks discussed with patient.

## 2025-01-21 NOTE — CARE PLAN
The patient is Stable - Low risk of patient condition declining or worsening    Shift Goals  Clinical Goals: Dilation and delivery  Patient Goals: pain control, delivery of healthy baby boy  Family Goals: support, delivery of healthy baby, healthy mom    Progress made toward(s) clinical / shift goals:  Dilation and pain control

## 2025-01-21 NOTE — ANESTHESIA POSTPROCEDURE EVALUATION
Patient: Fabiola Alexis    Procedure Summary       Date: 01/21/25 Room / Location:     Anesthesia Start: 1035 Anesthesia Stop: 1345    Procedure: Labor Epidural Diagnosis:     Scheduled Providers:  Responsible Provider: Bradley Srivastava M.D.    Anesthesia Type: epidural ASA Status: 2            Final Anesthesia Type: epidural  Last vitals  BP   Blood Pressure: 139/65    Temp   36.4 °C (97.5 °F)    Pulse   75   Resp   20    SpO2   97 %      Anesthesia Post Evaluation    Patient location during evaluation: bedside  Patient participation: complete - patient participated  Level of consciousness: awake and alert    Airway patency: patent  Anesthetic complications: no  Cardiovascular status: hemodynamically stable  Respiratory status: acceptable  Hydration status: euvolemic    PONV: none          No notable events documented.     Nurse Pain Score: 0 (NPRS)

## 2025-01-21 NOTE — ANESTHESIA TIME REPORT
Anesthesia Start and Stop Event Times       Date Time Event    1/21/2025 1030 Ready for Procedure     1035 Anesthesia Start     1345 Anesthesia Stop          Responsible Staff  01/21/25      Name Role Begin Khang Huerta Ma, M.D. Anesth 1035 1345          Overtime Reason:  no overtime (within assigned shift)    Comments:

## 2025-01-21 NOTE — ANESTHESIA PROCEDURE NOTES
Epidural Block    Date/Time: 1/21/2025 10:42 AM    Performed by: Bradley Srivastava M.D.  Authorized by: Bradley Srivastava M.D.    Patient Location:  OB  Start Time:  1/21/2025 10:42 AM  Reason for Block: labor analgesia    patient identified, IV checked, site marked, risks and benefits discussed, surgical consent, monitors and equipment checked and pre-op evaluation    Patient Position:  Sitting  Prep: ChloraPrep, patient draped and sterile technique    Monitoring:  Blood pressure, continuous pulse oximetry and heart rate  Approach:  Midline  Location:  L4-L5  Injection Technique:  PARISH air  Skin infiltration:  Lidocaine  Strength:  1%  Dose:  3ml  Needle Type:  Tuohy  Needle Gauge:  17 G  Needle Length:  3.5 in  Loss of resistance::  7  Catheter Size:  19 G  Catheter at Skin Depth:  14  Test Dose Result:  Negative

## 2025-01-21 NOTE — PROGRESS NOTES
"INTRAPARTUM EVALUATION    Subjective:  Pt is in active labor making progress she is tolerating contractions    Objective:  /57   Pulse 66   Temp 36.4 °C (97.5 °F) (Temporal)   Resp 20   Ht 1.626 m (5' 4\")   Wt 93.9 kg (207 lb)   LMP 2024   BMI 35.53 kg/m²   Cervix:rim/o/VTX  a trial push was done, rim persisted, will recheck 20-30 min  FHR tracin BPM, accels, some decels  [place]: CTX q 3 min    Assessment and Plan:  Fabiola Alexis is a  at 40w2d  1. Here for spontaneous labor  - Pitocin at 0  2.   Patient Active Problem List   Diagnosis    Supervision of normal first pregnancy, antepartum    Exercise-induced asthma    Nausea and vomiting during pregnancy    Mood changes    Threatened labor      3. FHT Cat 1  4. GBS neg    Anticipate     Lluvia Nuñez M.D.    "

## 2025-01-21 NOTE — H&P
"OB H&P:    CC: LOF    HPI:  Ms. Fabiola Alexis is a 24 y.o.  @ 40w2d by LMP with LOF and contractions. Pt says she felt a rush of fluids this morning around 6:20am. She says she had been feeling contractions prior to this and that they were about every 5 minutes and lasted for about 1 min. She denies any complications with this pregnancy. She denies headaches or vision changes.     Contractions: Yes   Loss of fluid: Yes   Vaginal bleeding: No   Fetal movement: present      [facility] with:  [facility]    Complications this pregnancy:  None    Prenatal Labs:  Rh+, Rubella Immune, HIV neg, TrepAb neg, HBsAg NR, Hep C Ab NR, GC/CT neg/neg  Glucola: normal 1hr  GBS -      ROS:  Const: denies fevers, general concerns  CV/resp: reports no concerns  GI: denies abd pain, GI concerns  : see HPI  Neuro: denies HA/vision changes    OB History    Para Term  AB Living   1             SAB IAB Ectopic Molar Multiple Live Births                    # Outcome Date GA Lbr Titus/2nd Weight Sex Type Anes PTL Lv   1 Current                GYN: denies STIs, no cervical procedures    No past medical history on file.    Past Surgical History:   Procedure Laterality Date    EYE SURGERY         No current facility-administered medications on file prior to encounter.     No current outpatient medications on file prior to encounter.       Family History   Problem Relation Age of Onset    No Known Problems Mother     No Known Problems Father     No Known Problems Sister     No Known Problems Brother        Social History     Tobacco Use    Smoking status: Never    Smokeless tobacco: Never   Vaping Use    Vaping status: Never Used   Substance Use Topics    Alcohol use: Not Currently    Drug use: Not Currently     Types: Marijuana         PE:   Vitals:    25 0811   BP: 120/57   Pulse: 66   Resp: 20   Temp: 36.4 °C (97.5 °F)   TempSrc: Temporal   Weight: 93.9 kg (207 lb)   Height: 1.626 m (5' 4\")     gen: " AAO, NAD  abd: soft, gravid, NT  Ext: NT, mild edema    SVE: 5/100/-2 @ 0830      A/P: 24 y.o.  @ 40w2d by LMP with LOF and contractions.     -Admit to Labor and Delivery  -Continuous external fetal monitoring and tocometer  -Patient wishes to have epidural for pain management   -Plan for normal vaginal delivery  -Contraception options discussed, patient plans to decide at a later time      Fe Escobedo DO  PGY-1 Family Medicine Resident  [facility]

## 2025-01-22 ENCOUNTER — PHARMACY VISIT (OUTPATIENT)
Dept: PHARMACY | Facility: MEDICAL CENTER | Age: 25
End: 2025-01-22
Payer: COMMERCIAL

## 2025-01-22 PROBLEM — J45.990 EXERCISE-INDUCED ASTHMA: Status: RESOLVED | Noted: 2024-06-19 | Resolved: 2025-01-22

## 2025-01-22 PROBLEM — Z34.00 SUPERVISION OF NORMAL FIRST PREGNANCY, ANTEPARTUM: Status: RESOLVED | Noted: 2024-06-19 | Resolved: 2025-01-22

## 2025-01-22 LAB
ERYTHROCYTE [DISTWIDTH] IN BLOOD BY AUTOMATED COUNT: 40.9 FL (ref 35.9–50)
HCT VFR BLD AUTO: 31.2 % (ref 37–47)
HGB BLD-MCNC: 10.8 G/DL (ref 12–16)
MCH RBC QN AUTO: 32 PG (ref 27–33)
MCHC RBC AUTO-ENTMCNC: 34.6 G/DL (ref 32.2–35.5)
MCV RBC AUTO: 92.6 FL (ref 81.4–97.8)
PLATELET # BLD AUTO: 172 K/UL (ref 164–446)
PMV BLD AUTO: 11 FL (ref 9–12.9)
RBC # BLD AUTO: 3.37 M/UL (ref 4.2–5.4)
WBC # BLD AUTO: 12.8 K/UL (ref 4.8–10.8)

## 2025-01-22 PROCEDURE — RXMED WILLOW AMBULATORY MEDICATION CHARGE: Performed by: OBSTETRICS & GYNECOLOGY

## 2025-01-22 PROCEDURE — 700102 HCHG RX REV CODE 250 W/ 637 OVERRIDE(OP): Performed by: OBSTETRICS & GYNECOLOGY

## 2025-01-22 PROCEDURE — A9270 NON-COVERED ITEM OR SERVICE: HCPCS | Performed by: OBSTETRICS & GYNECOLOGY

## 2025-01-22 PROCEDURE — 36415 COLL VENOUS BLD VENIPUNCTURE: CPT

## 2025-01-22 PROCEDURE — RXOTC WILLOW AMBULATORY OTC CHARGE

## 2025-01-22 PROCEDURE — 770002 HCHG ROOM/CARE - OB PRIVATE (112)

## 2025-01-22 PROCEDURE — 85027 COMPLETE CBC AUTOMATED: CPT

## 2025-01-22 RX ORDER — PSEUDOEPHEDRINE HCL 30 MG
100 TABLET ORAL 2 TIMES DAILY PRN
Qty: 20 CAPSULE | Refills: 0 | Status: SHIPPED | OUTPATIENT
Start: 2025-01-22

## 2025-01-22 RX ORDER — ACETAMINOPHEN 500 MG
1000 TABLET ORAL EVERY 6 HOURS PRN
Qty: 24 TABLET | Refills: 0 | Status: SHIPPED | OUTPATIENT
Start: 2025-01-22

## 2025-01-22 RX ORDER — IBUPROFEN 800 MG/1
800 TABLET, FILM COATED ORAL EVERY 8 HOURS PRN
Qty: 20 TABLET | Refills: 0 | Status: SHIPPED | OUTPATIENT
Start: 2025-01-22

## 2025-01-22 RX ADMIN — ACETAMINOPHEN 1000 MG: 500 TABLET ORAL at 19:22

## 2025-01-22 RX ADMIN — IBUPROFEN 800 MG: 800 TABLET, FILM COATED ORAL at 01:18

## 2025-01-22 RX ADMIN — SIMETHICONE 125 MG: 125 TABLET, CHEWABLE ORAL at 08:48

## 2025-01-22 RX ADMIN — ACETAMINOPHEN 1000 MG: 500 TABLET ORAL at 06:26

## 2025-01-22 RX ADMIN — DOCUSATE SODIUM 100 MG: 100 CAPSULE, LIQUID FILLED ORAL at 08:48

## 2025-01-22 RX ADMIN — IBUPROFEN 800 MG: 800 TABLET, FILM COATED ORAL at 18:12

## 2025-01-22 RX ADMIN — SIMETHICONE 125 MG: 125 TABLET, CHEWABLE ORAL at 12:42

## 2025-01-22 RX ADMIN — ACETAMINOPHEN 1000 MG: 500 TABLET ORAL at 12:42

## 2025-01-22 RX ADMIN — IBUPROFEN 800 MG: 800 TABLET, FILM COATED ORAL at 08:48

## 2025-01-22 ASSESSMENT — PAIN DESCRIPTION - PAIN TYPE
TYPE: ACUTE PAIN

## 2025-01-22 ASSESSMENT — EDINBURGH POSTNATAL DEPRESSION SCALE (EPDS)
I HAVE FELT SCARED OR PANICKY FOR NO GOOD REASON: NO, NOT MUCH
THE THOUGHT OF HARMING MYSELF HAS OCCURRED TO ME: NEVER
I HAVE BLAMED MYSELF UNNECESSARILY WHEN THINGS WENT WRONG: YES, SOME OF THE TIME
I HAVE BEEN SO UNHAPPY THAT I HAVE BEEN CRYING: YES, QUITE OFTEN
I HAVE BEEN ABLE TO LAUGH AND SEE THE FUNNY SIDE OF THINGS: AS MUCH AS I ALWAYS COULD
I HAVE BEEN ANXIOUS OR WORRIED FOR NO GOOD REASON: YES, SOMETIMES
I HAVE FELT SAD OR MISERABLE: NOT VERY OFTEN
THINGS HAVE BEEN GETTING ON TOP OF ME: NO, MOST OF THE TIME I HAVE COPED QUITE WELL
I HAVE BEEN SO UNHAPPY THAT I HAVE HAD DIFFICULTY SLEEPING: NOT AT ALL
I HAVE LOOKED FORWARD WITH ENJOYMENT TO THINGS: AS MUCH AS I EVER DID

## 2025-01-22 NOTE — CARE PLAN
The patient is Stable - Low risk of patient condition declining or worsening    Shift Goals  Clinical Goals: lochia wdl  Patient Goals: pain control, delivery of healthy baby boy  Family Goals: support, delivery of healthy baby, healthy mom    Progress made toward(s) clinical / shift goals:  FF@U with light lochia    Patient is not progressing towards the following goals:

## 2025-01-22 NOTE — PROGRESS NOTES
1900 Report received from Bernadine NEWMAN RN. POC discussed. Care assumed.    1925 Pt transferred to postpartum via wheelchair with infant in arms, FOB at side and belongings in possession. Report given to Priscilla PERALTA. POC discussed. VVS, fundus firm, bleeding scant. Infant bands verified. Care relinquished with pt in stable condition.

## 2025-01-22 NOTE — PROGRESS NOTES
"Post Partum Vaginal Delivery Note    Subjective: Doing well without significant complaints.  Normal lochia. Pt has perineal/bottom pain for which she is taking Tylenol and Advil. She is ambulating and voiding well. She has some Lower Back pain.    Vitals: /65   Pulse 67   Temp 37.1 °C (98.7 °F) (Temporal)   Resp 18   Ht 1.626 m (5' 4\")   Wt 93.9 kg (207 lb)   LMP 2024   SpO2 95%   Breastfeeding Unknown   BMI 35.53 kg/m²   Temp (24hrs), Av °C (98.6 °F), Min:36.4 °C (97.5 °F), Max:37.7 °C (99.8 °F)      Exam:      GEN: Patient without distress.    Abdomen: soft, non-tender; fundus firm at/below umbilicus    Extremitie: tr edema; calves non-tender  Exam of anus and perineum is c/w normal postaprtum changes, no stitches.    Delivery Blood Loss: 100 ml    Labs:  Recent Labs     25  0835 25  0425   WBC 11.9* 12.8*   RBC 4.03* 3.37*   HEMOGLOBIN 13.0 10.8*   HEMATOCRIT 35.4* 31.2*   MCV 87.8 92.6   MCH 32.3 32.0   RDW 39.1 40.9   PLATELETCT 195 172   MPV 10.6 11.0   NEUTSPOLYS 75.30*  --    LYMPHOCYTES 16.30*  --    MONOCYTES 6.80  --    EOSINOPHILS 0.60  --    BASOPHILS 0.30  --          Impression:   24 y.o.      Postpartum vaginal delivery    - routine postpartum care and maternal education    - Discharge patient later today, instructions given.      2. Abd binder for LBP.    3. Perineal/anal pain c/w normal postaprtum changes, no stitchses, no hematoma, no swollen hemmorhoid noted.        Signed By:   Lluvia Nuñez M.D.            "

## 2025-01-22 NOTE — PROGRESS NOTES
Arrived on the floor by wheelchair with family with her. Oriented to floor, call light given. Pain well controlled with Ibuprofen. No N/V noted. POc updated. Needs attended.

## 2025-01-22 NOTE — PROGRESS NOTES
Assessment done vital signs stable. Patient progressing according to plan of care. Fundus firm at the umbilicus with light lochia. Patient up voiding without difficulty. Ambulating with steady gait. Claims to have good pain relief with p.o medications. Breast and bottle feeding infant on demand. Family at bedside. Patient denies problems at this time. Patient encouraged to call for any needs.

## 2025-01-22 NOTE — DISCHARGE PLANNING
Discharge Planning Assessment Post Partum    Reviewed record and met with parents of infant at PP bedside    Reason for Referral: History of THC use and depression score of 23  Address: [address]  in [place]  Type of Living Situation:stable  Mom Diagnosis: post partum  Baby Diagnosis:   Primary Language: English    Name of Baby: Harris  Father of the Baby: Addi Nichols  Involved in baby’s care? yes    Prenatal Care: [facility]  Mom's PCP: none  PCP for new baby:[facility]    Support System: family  Coping/Bonding between mother & baby: appropriate  Source of Feeding: breast  Supplies for Infant: prepared    Mom's Insurance: Medicaid  Baby Covered on Insurance:yes  Mother Employed/School: none  Other children in the home/names & ages: no    Financial Hardship/Income: denies   Mom's Mental status: alert and oriented  Services used prior to admit: none    CPS History: no  Psychiatric History: depression - provided resources  Domestic Violence History: denies  Drug/ETOH History: THC     Resources Provided: community resources, [facility], [facility] PP support  Referrals Made: diaper bank     Clearance for Discharge: infant to discharge home to mother when ready.

## 2025-01-22 NOTE — DISCHARGE SUMMARY
Discharge Summary    CHIEF COMPLAINT ON ADMISSION  Chief Complaint   Patient presents with    Rupture of Membranes       Reason for Admission  Encounter for supervision of hailee*     Admission Date  1/21/2025    CODE STATUS  Full Code    HPI & HOSPITAL COURSE  This is a 24 y.o. female here with vaginal delivery   No notes on file    Therefore, she is discharged in good and stable condition to home with close outpatient follow-up.    The patient recovered much more quickly than anticipated on admission.    Discharge Date  1/22/25    FOLLOW UP ITEMS POST DISCHARGE  FU postaprtum clinic 5 weeks    DISCHARGE DIAGNOSES  Principal Problem:    Threatened labor (POA: Yes)  Resolved Problems:    * No resolved hospital problems. *      FOLLOW UP  Future Appointments   Date Time Provider Department Center   1/23/2025  1:45 PM [facility]OR[facility] Madrig[facility]     No follow-up provider specified.    MEDICATIONS ON DISCHARGE     Medication List        CONTINUE taking these medications        Instructions   Breast Pump Misc   1 Units every day.  Dose: 1 Units            ASK your doctor about these medications        Instructions   albuterol 108 (90 Base) MCG/ACT Aers inhalation aerosol   Inhale 2 Puffs every 6 hours as needed for Shortness of Breath.  Dose: 2 Puff     famotidine 40 MG Tabs  Commonly known as: Pepcid   Take 1 Tablet by mouth every day.  Dose: 40 mg     fluticasone 50 MCG/ACT nasal spray  Commonly known as: Flonase   Administer 1 Spray into affected nostril(S) every day.  Dose: 1 Spray     metoclopramide 10 MG Tabs  Commonly known as: Reglan   Take 1 Tablet by mouth 4 times a day.  Dose: 10 mg     PRENATAL VIT W/ FE BISG-FA PO   Take  by mouth.              Allergies  No Known Allergies    DIET  Orders Placed This Encounter   Procedures    Diet Order Diet: New Mom     Standing Status:   Standing     Number of Occurrences:   1     Order Specific Question:   Diet:     Answer:   New Mom [20]       ACTIVITY  As  "tolerated.  Weight bearing as tolerated    CONSULTATIONS  none    PROCEDURES  Vaginal Delivery    LABORATORY  No results found for: \"SODIUM\", \"POTASSIUM\", \"CHLORIDE\", \"CO2\", \"GLUCOSE\", \"BUN\", \"CREATININE\", \"GLOMRATE\"     Lab Results   Component Value Date    WBC 12.8 (H) 01/22/2025    HEMOGLOBIN 10.8 (L) 01/22/2025    HEMATOCRIT 31.2 (L) 01/22/2025    PLATELETCT 172 01/22/2025        Total time of the discharge process exceeds 20 minutes.  "

## 2025-01-22 NOTE — LACTATION NOTE
This note was copied from a baby's chart.  Initial Visit:    40w2d infant delivered vaginally to a  mother 24 at 1345    Recently documented latch score 8    Feeding Plan: breast and bottle    Breastfeeding History: none, this is her first child.     Medical History: No significant breast feeding risk factors noted     Fabiola reports that she has been able to breastfeed her baby frequently, latching without significant difficulty or pain. She has also offered baby bottles per her preference. LC offered assistance with latch and mom agrees.      Bilateral breast assessment WNL, nipples everted and intact, no damage noted.    Baby placed skin to skin in cross cradle position on the left side. LC assisted with proper positioning, breast wedging and asymmetrical latch technique. When presented the nipple, baby opened mouth with a wide gape and latched deeply to the breast. Organized and nutritive suck pattern noted, and pointed out to mom. Audible swallows noted.     Breast feeding education provided: Education provided regarding the milk making process and supply in demand. Frequent skin to skin encouraged. Encouraged MOB to offer the breast to baby any time he is showing hunger cues (cues reviewed). Encouraged MOB to allow baby to self limit at the breast. Anticipatory guidance provided regarding typical  feeding behaviors in the first 24-48hrs, including cluster feeding. Proper positioning and latch technique verbalized. Education provided regarding the importance of achieving a deep latch with each feeding to ensure proper stimulation, milk transfer, and reduce the chance for nipple damage/pain. Watch for stools to become yellow/green by day 5. She was encouraged to breastfeed baby before offering a bottle to ensure adequate breast stimulation.     Plan: Continue offering the breast to baby on cue, a minimum of 8 times every 24hrs. Skin to skin for each feeding. Hand expression and feed back colostrum  (with RN assistance) if baby due to feed, but too sleepy or unable to latch. Do not limit baby's time at the breast. Reach out to RN/LC if experiencing pain with latch or if unable to latch baby independently. She is enrolled with WIC in [place]. LC provided parents with educational handouts. [facility] outpatient LC resources handout provided.

## 2025-01-23 VITALS
HEART RATE: 67 BPM | WEIGHT: 207 LBS | TEMPERATURE: 98 F | RESPIRATION RATE: 18 BRPM | OXYGEN SATURATION: 95 % | DIASTOLIC BLOOD PRESSURE: 79 MMHG | BODY MASS INDEX: 35.34 KG/M2 | SYSTOLIC BLOOD PRESSURE: 122 MMHG | HEIGHT: 64 IN

## 2025-01-23 PROBLEM — O47.9 THREATENED LABOR: Status: RESOLVED | Noted: 2025-01-21 | Resolved: 2025-01-23

## 2025-01-23 PROCEDURE — 700102 HCHG RX REV CODE 250 W/ 637 OVERRIDE(OP): Performed by: OBSTETRICS & GYNECOLOGY

## 2025-01-23 PROCEDURE — A9270 NON-COVERED ITEM OR SERVICE: HCPCS | Performed by: OBSTETRICS & GYNECOLOGY

## 2025-01-23 RX ORDER — SIMETHICONE 125 MG
125 TABLET,CHEWABLE ORAL 4 TIMES DAILY PRN
Qty: 120 TABLET | Refills: 0 | Status: SHIPPED | OUTPATIENT
Start: 2025-01-23

## 2025-01-23 RX ADMIN — ACETAMINOPHEN 1000 MG: 500 TABLET ORAL at 07:43

## 2025-01-23 RX ADMIN — DOCUSATE SODIUM 100 MG: 100 CAPSULE, LIQUID FILLED ORAL at 07:43

## 2025-01-23 RX ADMIN — SIMETHICONE 125 MG: 125 TABLET, CHEWABLE ORAL at 07:43

## 2025-01-23 ASSESSMENT — PAIN DESCRIPTION - PAIN TYPE
TYPE: ACUTE PAIN
TYPE: ACUTE PAIN

## 2025-01-23 NOTE — DISCHARGE INSTRUCTIONS

## 2025-01-23 NOTE — DISCHARGE SUMMARY
Discharge Summary:      Fabiola Alexis    Admit Date:   2025  Discharge Date:  2025     Admitting diagnosis:  Encounter for supervision of normal first pregnancy, unspecified trimester [Z34.00]  Threatened labor [O47.9]  Discharge Diagnosis: Status post vaginal, spontaneous.  Pregnancy Complications: none  Tubal Ligation:  no        History:  History reviewed. No pertinent past medical history.  OB History    Para Term  AB Living   1 1 1     1   SAB IAB Ectopic Molar Multiple Live Births           0 1      # Outcome Date GA Lbr Titus/2nd Weight Sex Type Anes PTL Lv   1 Term 24 40w2d / 04:15 3.845 kg (8 lb 7.6 oz) M Vag-Spont EPI Y TOM        Patient has no known allergies.  Patient Active Problem List    Diagnosis Date Noted    Threatened labor 2025    Mood changes 10/04/2024    Nausea and vomiting during pregnancy 2024        Hospital Course:   24 y.o. , now para 1, was admitted with the above mentioned diagnosis, underwent Active Labor, vaginal, spontaneous. Patient postpartum course was unremarkable, with progressive advancement in diet , ambulation and toleration of oral analgesia. Patient without complaints today and desires discharge.      Vitals:    25 0209 25 0534 25 1740 25 0600   BP: 125/65 116/65 130/73 122/79   Pulse: 69 67 74 67   Resp: 18 18 18 18   Temp: 37.2 °C (99 °F) 37.1 °C (98.7 °F) 37.2 °C (98.9 °F) 36.7 °C (98 °F)   TempSrc: Temporal Temporal Temporal Temporal   SpO2: 95% 95% 95% 95%   Weight:       Height:           Current Facility-Administered Medications   Medication Dose    oxytocin (Pitocin) infusion (for post delivery)  125 mL/hr    lactated ringers infusion  2,000 mL    docusate sodium (Colace) capsule 100 mg  100 mg    ibuprofen (Motrin) tablet 800 mg  800 mg    acetaminophen (Tylenol) tablet 1,000 mg  1,000 mg    oxyCODONE immediate-release (Roxicodone) tablet 5 mg  5 mg    simethicone (Mylicon) chewable  tablet 125 mg  125 mg       Exam:  Breast Exam: negative  Abdomen: Abdomen soft, non-tender. BS normal. No masses,  No organomegaly  Fundus Non Tender: yes  Incision: none  Perineum: well healing  Extremity: extremities, peripheral pulses and reflexes normal     Labs:  Recent Labs     01/21/25  0835 01/22/25  0425   WBC 11.9* 12.8*   RBC 4.03* 3.37*   HEMOGLOBIN 13.0 10.8*   HEMATOCRIT 35.4* 31.2*   MCV 87.8 92.6   MCH 32.3 32.0   MCHC 36.7* 34.6   RDW 39.1 40.9   PLATELETCT 195 172   MPV 10.6 11.0        Activity:   Discharge to home  Pelvic Rest x 6 weeks    Assessment:  normal postpartum course  Discharge Assessment:  No areas of skin breakdown., Taking adequate diet and fluids., No heavy bleeding or foul vaginal discharge., No breast redness or severe pain of the breast., and Voiding without difficulty      Follow up: .[facility] or [facility] in 5 weeks for vaginal    Pt need to RT [facility] or ER if any of the following occur:  Fever over 100,5  Severe abd pain  Red streaks or painful masses in the breasts  Foul smelling d/c or lochia  Heavy vaginal bleeding saturating a pad per hour  S/s of PP depression  S/s of PP PIH    Discharge Meds:   Current Outpatient Medications   Medication Sig Dispense Refill    acetaminophen (TYLENOL) 500 MG Tab Take 2 Tablets by mouth every 6 hours as needed for Mild Pain. 24 Tablet 0    docusate sodium 100 MG Cap Take 100 mg by mouth 2 times a day as needed for Constipation. 20 Capsule 0    ibuprofen (MOTRIN) 800 MG Tab Take 1 Tablet by mouth every 8 hours as needed for Moderate Pain. 20 Tablet 0       ZENA SalasNPAM

## 2025-01-23 NOTE — PROGRESS NOTES
Assumed care. Report received from Elroy PERALTA. Received on her room, breastfeeding her NB. C/o pain on her lower back, heat pack applied. POC updated. FOB @ bedside involved in the care. Needs attended. Call light within reach.

## 2025-01-23 NOTE — LACTATION NOTE
Followup visit  MOB reports baby is latching well. She reports cluster feeding last night but baby mostly wanted to sleep and be held. She has also offered some formula after some breastfeeds.  MOB reports significant breast growth during pregnancy. Breasts wide spaced. Nipples intact bilaterally. She reports baby prefers right breast. Baby assessed. Back of head molded flat, reviewed doing skin to skin whenever parent is awake and alert. Demonstrated safe skin to skin on FOB. MOB and FOB voice understanding.  Guppy pose taught to parents. Baby prefers to turn head to his right, but does turn left. Discussed benefits of doing gentle stretches and skin to skin.  Reviewed normal  feeding frequency of first 5 days and 6-8 weeks, discussed stool changes expected by day 5. [facility] christina and encouraged to view video on how to know baby is getting enough milk.  Encouraged attendance at bfdg groups post d/c for ongoing support.  MOB reports she has been hand expressing her EBM and feeding it back to baby. Reviewed when to offer formula , reviewed hunger cues and feeding on cue without time limits. MOB reports she has been taught how to  break latch and remove baby from breast if suckling is painful.   Encouraged to call for latch assessment with next feeding.  F/U MOB preparing for dc. Baby showing hunger cues.MOB reports she is struggling to latch baby on left. MOB prefers cross cradle hold. Baby tight and fussy on left in cross cradle hold. MOB encouraged to offer right breast and baby latches quickly in cross cradle hold. After 5 minutes, MOB encouraged to break latch and bring baby in football hold to left breast. Deep latch obtained quickly.  Discussed switch nursing to keep baby happy at breast. Reviewed good positioning and latch, discussed normal  feeding frequency of first 6-8 weeks, MOB taught how to visualize and listen for swallows.  MOB able to independently and comfortably latch  baby. Encouraged attendance at bfdg groups for ongoing support.

## 2025-01-23 NOTE — CARE PLAN
Problem: Pain - Standard  Goal: Alleviation of pain or a reduction in pain to the patient’s comfort goal  Outcome: Progressing     Problem: Infection - Postpartum  Goal: Postpartum patient will be free of signs and symptoms of infection  Outcome: Progressing     Problem: Psychosocial - Postpartum  Goal: Patient will verbalize and demonstrate effective bonding and parenting behavior  Outcome: Progressing   The patient is Stable - Low risk of patient condition declining or worsening    Shift Goals  Clinical Goals: pain control, lochia WNL  Patient Goals: pain control and milk production  Family Goals: support, delivery of healthy baby, healthy mom    Progress made toward(s) clinical / shift goals:  Pain well controlled with PRN Tylenol and Ibuprofen. Able to relax. Appears calm. Per Pt she's producing breast milk.     Patient is not progressing towards the following goals:

## 2025-02-25 ENCOUNTER — POST PARTUM (OUTPATIENT)
Dept: OBGYN | Facility: CLINIC | Age: 25
End: 2025-02-25
Payer: MEDICAID

## 2025-02-25 VITALS — WEIGHT: 201 LBS | DIASTOLIC BLOOD PRESSURE: 74 MMHG | SYSTOLIC BLOOD PRESSURE: 118 MMHG | BODY MASS INDEX: 34.5 KG/M2

## 2025-02-25 RX ORDER — MEDROXYPROGESTERONE ACETATE 150 MG/ML
150 INJECTION, SUSPENSION INTRAMUSCULAR ONCE
Status: COMPLETED | OUTPATIENT
Start: 2025-02-25 | End: 2025-02-25

## 2025-02-25 RX ADMIN — MEDROXYPROGESTERONE ACETATE 150 MG: 150 INJECTION, SUSPENSION INTRAMUSCULAR at 09:20

## 2025-02-25 ASSESSMENT — ENCOUNTER SYMPTOMS
EYES NEGATIVE: 1
CARDIOVASCULAR NEGATIVE: 1
MUSCULOSKELETAL NEGATIVE: 1
NEUROLOGICAL NEGATIVE: 1
RESPIRATORY NEGATIVE: 1
GASTROINTESTINAL NEGATIVE: 1
PSYCHIATRIC NEGATIVE: 1
CONSTITUTIONAL NEGATIVE: 1

## 2025-02-25 ASSESSMENT — EDINBURGH POSTNATAL DEPRESSION SCALE (EPDS)
I HAVE BLAMED MYSELF UNNECESSARILY WHEN THINGS WENT WRONG: YES, SOME OF THE TIME
I HAVE FELT SAD OR MISERABLE: NOT VERY OFTEN
I HAVE LOOKED FORWARD WITH ENJOYMENT TO THINGS: AS MUCH AS I EVER DID
I HAVE BEEN SO UNHAPPY THAT I HAVE HAD DIFFICULTY SLEEPING: NOT AT ALL
I HAVE BEEN ABLE TO LAUGH AND SEE THE FUNNY SIDE OF THINGS: AS MUCH AS I ALWAYS COULD
I HAVE FELT SCARED OR PANICKY FOR NO GOOD REASON: YES, SOMETIMES
I HAVE BEEN SO UNHAPPY THAT I HAVE BEEN CRYING: ONLY OCCASIONALLY
I HAVE BEEN ANXIOUS OR WORRIED FOR NO GOOD REASON: YES, SOMETIMES
TOTAL SCORE: 9
THINGS HAVE BEEN GETTING ON TOP OF ME: NO, MOST OF THE TIME I HAVE COPED QUITE WELL
THE THOUGHT OF HARMING MYSELF HAS OCCURRED TO ME: NEVER

## 2025-02-25 NOTE — PROGRESS NOTES
Subjective     Fabiola Alexis is a 24 y.o.  female who presents for her postpartum exam. She had a  without complication. Her prenatal course was uncomplicated. Eating a regular diet without difficulty. Bowel movement are Normal.  Pt reports back pain and feeling of bladder fullness.  Vaginal bleeding: has begun menses on 2025. Patient Denies Incisional pain, drainage or redness.  She is both formula and breast feeding. She desires a depo now and wants to consider IUD in several months for her birth control method. Reports no sex prior to this appointment.  Reports baby blues and adjustment to new baby.    Assessment   Assessment:    1. PP care of lactating women   2. Exam WNL   3. Pap WNL in   4. Desires contraception   5. EPDS score: 9    Genital exam: normal  Discussed need to begin [place] and exercises for pelvic and lower back strengthening.     Patient Active Problem List    Diagnosis Date Noted    Encounter for postpartum care of lactating mother 2025    Mood changes 10/04/2024    Nausea and vomiting during pregnancy 2024       Plan   Plan:    1. Breastfeeding support   2. Continue PNV   3. Contraceptive counseling - Depo today, informational brochures for IUDs provided  4. Encouraged condom use for 1wk  5. Discussed diet, exercise and resumption of sexual activity   6. Gave copy of pap   7.  F/u c PCP or [facility] clinic as needed for primary care needs.       HPI    Review of Systems   Constitutional: Negative.    HENT: Negative.     Eyes: Negative.    Respiratory: Negative.     Cardiovascular: Negative.    Gastrointestinal: Negative.    Genitourinary: Negative.    Musculoskeletal: Negative.    Skin: Negative.    Neurological: Negative.    Endo/Heme/Allergies: Negative.    Psychiatric/Behavioral: Negative.                Objective     /74   Wt 201 lb   LMP 2024   BMI 34.50 kg/m²      Physical Exam  Constitutional:       Appearance: She is well-developed.    Pulmonary:      Effort: Pulmonary effort is normal.   Abdominal:      Palpations: Abdomen is soft.   Genitourinary:     Exam position: Supine.      Labia:         Right: No rash, tenderness, lesion or injury.         Left: No rash, tenderness, lesion or injury.       Vagina: Normal. No signs of injury. No vaginal discharge, erythema, tenderness or bleeding.      Rectum: No tenderness.   Skin:     General: Skin is warm and dry.   Neurological:      Mental Status: She is alert and oriented to person, place, and time.   Psychiatric:         Behavior: Behavior normal.         Thought Content: Thought content normal.         Judgment: Judgment normal.                                  Assessment & Plan  Encounter for postpartum care of lactating mother    Orders:    medroxyPROGESTERone (Depo-Provera) injection 150 mg

## 2025-02-25 NOTE — PROGRESS NOTES
Pt here today for postpartum exam.  Delivery type: vaginal delivery on 1/21/25  Currently : mostly formula, some breast feeding  Desired BCM:  IUD  LMP: 2/23/25  Last pap: per notes done in 2023. Per patient it was normal  EPDS: 9  Phone # [phone].  Depo Provera given to patient today.  Next dose due 5/13/25 to 5/27/2025

## 2025-04-02 ENCOUNTER — TELEPHONE (OUTPATIENT)
Dept: OBGYN | Facility: CLINIC | Age: 25
End: 2025-04-02
Payer: MEDICAID

## 2025-04-02 NOTE — TELEPHONE ENCOUNTER
Caller Name: Fabiola Alexis     Call Back Number: [phone]    How would the patient prefer to be contacted with a response: phone call     Pt called stating she is having VB on the depo she got her depo on 2-25-25,she has had some bleeding and wanted to know if that is normal consulted with provider and then notified pt that it is normal if she start to bleed heavy a pad or two a hour then she will need to call us or go to the ER

## 2025-04-30 ENCOUNTER — APPOINTMENT (OUTPATIENT)
Dept: URGENT CARE | Facility: PHYSICIAN GROUP | Age: 25
End: 2025-04-30
Payer: MEDICAID

## 2025-05-01 ENCOUNTER — TELEPHONE (OUTPATIENT)
Dept: OBGYN | Facility: CLINIC | Age: 25
End: 2025-05-01
Payer: MEDICAID

## 2025-05-01 NOTE — TELEPHONE ENCOUNTER
"Hi, I am wondering if I am experiencing post partum anxiety/depression am I okay to start on the Zoloft that I was given at the beginning of my pregnancy?   5/1/25@9:59 am. Call to patient regarding above patient advise request sent yesterday. Patient states she is feeling very anxious, she had to go to [facility] because of that and also because she was having chest pain. They did EKG and blood work and everything came back normal was told to go to primary care. No other advise or medication given. No advice given about  taking or not \"Zoloft\". Patient states she is already seen a counselor, next appt is 5/5/25. Patient denies any thoughts of hurting herself or other, states eating and drinking fluids ok, states having difficulty sleeping. Consulted with Skip Esquivel CNM regarding Zoloft she advices that patient needs to be evaluated because giving her an answer. Patient agrees to that. Appt scheduled for 5/5/25.  "

## 2025-05-05 ENCOUNTER — HOSPITAL ENCOUNTER (OUTPATIENT)
Facility: MEDICAL CENTER | Age: 25
End: 2025-05-05
Payer: MEDICAID

## 2025-05-05 ENCOUNTER — OFFICE VISIT (OUTPATIENT)
Dept: OBGYN | Facility: CLINIC | Age: 25
End: 2025-05-05
Payer: MEDICAID

## 2025-05-05 VITALS — SYSTOLIC BLOOD PRESSURE: 110 MMHG | BODY MASS INDEX: 35.19 KG/M2 | WEIGHT: 205 LBS | DIASTOLIC BLOOD PRESSURE: 70 MMHG

## 2025-05-05 DIAGNOSIS — R45.86 MOOD CHANGES: ICD-10-CM

## 2025-05-05 DIAGNOSIS — Z30.09 BIRTH CONTROL COUNSELING: ICD-10-CM

## 2025-05-05 DIAGNOSIS — N89.8 VAGINAL DISCHARGE: ICD-10-CM

## 2025-05-05 LAB
CANDIDA DNA VAG QL PROBE+SIG AMP: NEGATIVE
G VAGINALIS DNA VAG QL PROBE+SIG AMP: POSITIVE
T VAGINALIS DNA VAG QL PROBE+SIG AMP: NEGATIVE

## 2025-05-05 PROCEDURE — 99213 OFFICE O/P EST LOW 20 MIN: CPT

## 2025-05-05 PROCEDURE — 87480 CANDIDA DNA DIR PROBE: CPT

## 2025-05-05 PROCEDURE — 3074F SYST BP LT 130 MM HG: CPT

## 2025-05-05 PROCEDURE — 3078F DIAST BP <80 MM HG: CPT

## 2025-05-05 PROCEDURE — 87660 TRICHOMONAS VAGIN DIR PROBE: CPT

## 2025-05-05 PROCEDURE — 87510 GARDNER VAG DNA DIR PROBE: CPT

## 2025-05-05 ASSESSMENT — EDINBURGH POSTNATAL DEPRESSION SCALE (EPDS)
I HAVE BEEN ANXIOUS OR WORRIED FOR NO GOOD REASON: YES, VERY OFTEN
THINGS HAVE BEEN GETTING ON TOP OF ME: YES, SOMETIMES I HAVEN'T BEEN COPING AS WELL AS USUAL
I HAVE LOOKED FORWARD WITH ENJOYMENT TO THINGS: AS MUCH AS I EVER DID
I HAVE BEEN ABLE TO LAUGH AND SEE THE FUNNY SIDE OF THINGS: AS MUCH AS I ALWAYS COULD
THE THOUGHT OF HARMING MYSELF HAS OCCURRED TO ME: NEVER
I HAVE BEEN SO UNHAPPY THAT I HAVE HAD DIFFICULTY SLEEPING: YES, SOMETIMES
I HAVE FELT SAD OR MISERABLE: YES, QUITE OFTEN
I HAVE FELT SCARED OR PANICKY FOR NO GOOD REASON: YES, QUITE A LOT
I HAVE BLAMED MYSELF UNNECESSARILY WHEN THINGS WENT WRONG: YES, MOST OF THE TIME
I HAVE BEEN SO UNHAPPY THAT I HAVE BEEN CRYING: ONLY OCCASIONALLY
TOTAL SCORE: 16

## 2025-05-05 NOTE — PROGRESS NOTES
Patient here for PPD evaluation   LMP=  2/22  BCM: pt is currently on depo  Last pap: 2023 WNL   Phone number: [phone] (home)   Pharmacy verified  EPDS- 16    - pt has concerns regarding vaginal discharge, would like to know what medications are safe to take while breastfeeding for anxiety.

## 2025-05-05 NOTE — PROGRESS NOTES
Fabiola Alexis is a 24 y.o. female here today for evaluation and management of postpartum anxiety.     Reports feeling a baseline high level of anxiety daily. She did have an appt with a PCP though went to the ED instead d/t acute concerns for shortness of breath. A cardiac workup was completed there and they told her it was likely anxiety. Denies continues SOB though reports generally high level of anxiety as if her mind is racing and she is worrying about everything.     She reports sleeping 4-6 hours nightly. Her partner helps as much as he can though he just started working a new job with 12 hour shifts. His family (mother, sisters) also have offered to help care for her . She did let her  stay with them overnight recently and was able to sleep 8+ hours, reports feeling much better afterwards.     Reports normal diet. No concerns for SI/HI. She was prescribed Zoloft in pregnancy and is interested in starting with that now. Is seeing a therapist.     She is using Depo for BC though would like to consider a different method. She also reports some vaginal discharge that itches occasionally, no foul odor.     Current medicines (including changes today)  Current Outpatient Medications   Medication Sig Dispense Refill    simethicone (MYLICON) 125 MG chewable tablet Chew 1 Tablet 4 times a day as needed for Flatulence. (Patient not taking: Reported on 2025) 120 Tablet 0    acetaminophen (TYLENOL) 500 MG Tab Take 2 Tablets by mouth every 6 hours as needed for Mild Pain. (Patient not taking: Reported on 2025) 24 Tablet 0    docusate sodium 100 MG Cap Take 100 mg by mouth 2 times a day as needed for Constipation. (Patient not taking: Reported on 2025) 20 Capsule 0    ibuprofen (MOTRIN) 800 MG Tab Take 1 Tablet by mouth every 8 hours as needed for Moderate Pain. (Patient not taking: Reported on 2025) 20 Tablet 0    albuterol 108 (90 Base) MCG/ACT Aero Soln inhalation aerosol Inhale 2  Puffs every 6 hours as needed for Shortness of Breath. (Patient not taking: Reported on 5/5/2025) 8.5 g 0    Misc. Devices (BREAST PUMP) Misc 1 Units every day. (Patient not taking: Reported on 5/5/2025) 1 Each 0    fluticasone (FLONASE) 50 MCG/ACT nasal spray Administer 1 Spray into affected nostril(S) every day. (Patient not taking: Reported on 5/5/2025) 16 g 0    famotidine (PEPCID) 40 MG Tab Take 1 Tablet by mouth every day. (Patient not taking: Reported on 5/5/2025) 60 Tablet 0    PRENATAL VIT W/ FE BISG-FA PO Take  by mouth. (Patient not taking: Reported on 5/5/2025)      metoclopramide (REGLAN) 10 MG Tab Take 1 Tablet by mouth 4 times a day. (Patient not taking: Reported on 10/24/2024) 120 Tablet 1     No current facility-administered medications for this visit.       She  has no past medical history on file.    She  has a past surgical history that includes eye surgery.         SUBSTANCE USE/ADDICTION HISTORY:  Does patient acknowledge use of/dependence on substances? No    ABUSE/NEGLECT:  Does patient report feeling “unsafe” in his/her home, or afraid of anyone? No   Is there evidence of neglect by self? No   Is there evidence of neglect of children/baby? No        SAFETY ASSESSMENT - SELF:  Does patient acknowledge current or past symptoms of dangerousness to self? No     ROS  Mood: Describes current mood as high baseline anxiety; denies specific triggers or panic attacks  Anxiety: Reports frequent worry about everything, feels like her mind is always racing  Sleep:  Reports sleeping 4-6 hours at night; wakes to bottle feed; sometimes too anxious to fall sleep  Psychomotor/Energy: Reports some physical anxiety/tightness in upper body  Eating/Appetite:  denies increased appetite, decreased appetite. Reports she has a good relationship with food.   Cognitive: Reports distractibility, difficulty maintaining focus.   Psychosis:  Denies hallucinations, delusions, paranoia. Denies intrusive thoughts. Denies  SI/HI. Denies thought of hurting her baby.   Social:  Reports good relationship with peers. Reports good relationship with partner. Denies avoiding social interactions or feeling socially withdrawn.    All other systems reviewed and are negative.        Objective:     /70 (BP Location: Right arm, Patient Position: Sitting, BP Cuff Size: Adult)   Wt 205 lb  Body mass index is 35.19 kg/m².    Physical Exam:   Constitutional: Alert, no distress, good eye contact   Respiratory: Unlabored respiratory effort, speaking in full sentences.   Skin: Warm, dry, good turgor, no rashes in visible areas.  Psych: Alert and oriented x3, appropriate affect and mood.   Participation: Active verbal participation and attentive to visit  Grooming:  appropriate  Behavior: appropriate   Mood:  appropriate  Affect: appropriate  Thought process: wnl   Thought content: wnl  Speech: Rate within normal limits and Volume within normal limits  Perception: Within normal limits  Memory:  No gross evidence of memory deficits  Insight:  Within normal limits  Judgment: Within normal limits      Assessment and Plan:   The following treatment plan was discussed    [place]  Depression Scale  Score 2025:     Postpartum Depression: High Risk (2025)    [place]  Depression Scale     Last EPDS Total Score: 16     Last EPDS Self Harm Result: Never         There are no diagnoses linked to this encounter.    #PPD/anxiety  -Current Suicide/Homicide Risk: none  -Safety Plan completed/reviewed, information provided for appropriate contacts. Discussed with patient s/s to seek emergent care or to report to ER.   -Reviewed indication, dosage, usage and potential adverse effects of zoloft. Patient appears to understand, verbalizes understanding and is willing to try medications as prescribed. Rx resent. Will start at 25mg, titrate to 50mg in 2-3 weeks.   -Encouraged to continue with therapy.   -Discussed using family/friends to take  breaks to take care for herself.     #Vaginal discharge  -Vaginal pathogens sent d/t concerns for discharge    #Desires BC  -Discussed BC methods--pt would like IUD. Discussed risks/benefits, insertion method, efficacy, side effects, premedicating with ibuprofen. Pt desires and will schedule.         Sachi Osborn C.N.M.

## 2025-05-06 ENCOUNTER — TELEPHONE (OUTPATIENT)
Dept: OBGYN | Facility: CLINIC | Age: 25
End: 2025-05-06
Payer: MEDICAID

## 2025-05-06 NOTE — TELEPHONE ENCOUNTER
Caller Name: Fabiola Alexis  Call Back Number: [phone]    How would the patient prefer to be contacted with a response: Phone call do NOT leave a detailed message    Pt called stating Sachi gave her an option to treat her BV at home with boric acid or get antibiotics if it came back positive. Pt wanted to let Sachi know she would rather have antibiotics

## 2025-05-09 ENCOUNTER — RESULTS FOLLOW-UP (OUTPATIENT)
Dept: OBGYN | Facility: CLINIC | Age: 25
End: 2025-05-09
Payer: MEDICAID

## 2025-05-09 RX ORDER — METRONIDAZOLE 500 MG/1
500 TABLET ORAL 2 TIMES DAILY
Qty: 14 TABLET | Refills: 0 | Status: SHIPPED | OUTPATIENT
Start: 2025-05-09 | End: 2025-05-16

## 2025-05-09 NOTE — TELEPHONE ENCOUNTER
----- Message from Midwife Sachi Osborn C.N.M. sent at 5/9/2025 12:16 PM PDT -----  Please call pt to notify her of positive BV, metronidazole has been sent in.    Called pt and informed her test came back positive for BV, explained this is not an STI. Pt informed she needs to start antibiotics. Should take the full Tx and advised to take meds with food but not with milk and to avoid alcohol, sun exposure and intercourse while on Tx. Pharmacy verified with pt. Pt agreed and verbalized understanding.

## 2025-06-04 ENCOUNTER — APPOINTMENT (OUTPATIENT)
Dept: OBGYN | Facility: CLINIC | Age: 25
End: 2025-06-04
Payer: MEDICAID

## 2025-06-05 ENCOUNTER — NON-PROVIDER VISIT (OUTPATIENT)
Dept: OBGYN | Facility: CLINIC | Age: 25
End: 2025-06-05
Payer: MEDICAID

## 2025-06-05 NOTE — PROGRESS NOTES
Pt here for depo shot   Pt states she had unprotected intercourse last week .  Consulted with a provider  and she would like pt to come back in 3 weeks to get the depo shot pt is also out of range and will need a pregnancy test.   Pt was upset that she couldn't get the depo shot today.

## 2025-06-12 ENCOUNTER — OFFICE VISIT (OUTPATIENT)
Dept: URGENT CARE | Facility: PHYSICIAN GROUP | Age: 25
End: 2025-06-12
Payer: MEDICAID

## 2025-06-12 ENCOUNTER — HOSPITAL ENCOUNTER (OUTPATIENT)
Facility: MEDICAL CENTER | Age: 25
End: 2025-06-12
Attending: FAMILY MEDICINE
Payer: MEDICAID

## 2025-06-12 VITALS
BODY MASS INDEX: 34.62 KG/M2 | DIASTOLIC BLOOD PRESSURE: 78 MMHG | RESPIRATION RATE: 18 BRPM | SYSTOLIC BLOOD PRESSURE: 110 MMHG | OXYGEN SATURATION: 98 % | TEMPERATURE: 97.2 F | WEIGHT: 207.8 LBS | HEIGHT: 65 IN | HEART RATE: 88 BPM

## 2025-06-12 DIAGNOSIS — N89.8 VAGINAL DISCHARGE: Primary | ICD-10-CM

## 2025-06-12 DIAGNOSIS — N89.8 VAGINAL DISCHARGE: ICD-10-CM

## 2025-06-12 DIAGNOSIS — R11.0 NAUSEA: ICD-10-CM

## 2025-06-12 LAB
APPEARANCE UR: NORMAL
BILIRUB UR STRIP-MCNC: NEGATIVE MG/DL
CANDIDA DNA VAG QL PROBE+SIG AMP: NEGATIVE
COLOR UR AUTO: YELLOW
G VAGINALIS DNA VAG QL PROBE+SIG AMP: NEGATIVE
GLUCOSE UR STRIP.AUTO-MCNC: NEGATIVE MG/DL
KETONES UR STRIP.AUTO-MCNC: NEGATIVE MG/DL
LEUKOCYTE ESTERASE UR QL STRIP.AUTO: NORMAL
NITRITE UR QL STRIP.AUTO: NEGATIVE
PH UR STRIP.AUTO: 6 [PH] (ref 5–8)
POCT INT CON NEG: NEGATIVE
POCT INT CON POS: POSITIVE
POCT URINE PREGNANCY TEST: NEGATIVE
PROT UR QL STRIP: NEGATIVE MG/DL
RBC UR QL AUTO: NEGATIVE
SP GR UR STRIP.AUTO: 1.01
T VAGINALIS DNA VAG QL PROBE+SIG AMP: NEGATIVE
UROBILINOGEN UR STRIP-MCNC: 0.2 MG/DL

## 2025-06-12 PROCEDURE — 87660 TRICHOMONAS VAGIN DIR PROBE: CPT

## 2025-06-12 PROCEDURE — 87510 GARDNER VAG DNA DIR PROBE: CPT

## 2025-06-12 PROCEDURE — 1125F AMNT PAIN NOTED PAIN PRSNT: CPT | Performed by: NURSE PRACTITIONER

## 2025-06-12 PROCEDURE — 3078F DIAST BP <80 MM HG: CPT | Performed by: NURSE PRACTITIONER

## 2025-06-12 PROCEDURE — 99214 OFFICE O/P EST MOD 30 MIN: CPT | Performed by: NURSE PRACTITIONER

## 2025-06-12 PROCEDURE — 81025 URINE PREGNANCY TEST: CPT | Performed by: NURSE PRACTITIONER

## 2025-06-12 PROCEDURE — 87591 N.GONORRHOEAE DNA AMP PROB: CPT

## 2025-06-12 PROCEDURE — 81002 URINALYSIS NONAUTO W/O SCOPE: CPT | Performed by: NURSE PRACTITIONER

## 2025-06-12 PROCEDURE — 3074F SYST BP LT 130 MM HG: CPT | Performed by: NURSE PRACTITIONER

## 2025-06-12 PROCEDURE — 87491 CHLMYD TRACH DNA AMP PROBE: CPT

## 2025-06-12 PROCEDURE — 87480 CANDIDA DNA DIR PROBE: CPT

## 2025-06-12 RX ORDER — ONDANSETRON 4 MG/1
4 TABLET, ORALLY DISINTEGRATING ORAL EVERY 6 HOURS PRN
Qty: 10 TABLET | Refills: 0 | Status: SHIPPED | OUTPATIENT
Start: 2025-06-12 | End: 2025-06-17

## 2025-06-12 RX ORDER — ONDANSETRON 4 MG/1
4 TABLET, ORALLY DISINTEGRATING ORAL ONCE
Status: COMPLETED | OUTPATIENT
Start: 2025-06-12 | End: 2025-06-12

## 2025-06-12 RX ORDER — METRONIDAZOLE 7.5 MG/G
1 GEL VAGINAL
Qty: 7 EACH | Refills: 0 | Status: SHIPPED | OUTPATIENT
Start: 2025-06-12 | End: 2025-06-19

## 2025-06-12 RX ADMIN — ONDANSETRON 4 MG: 4 TABLET, ORALLY DISINTEGRATING ORAL at 13:13

## 2025-06-12 ASSESSMENT — PAIN SCALES - GENERAL: PAINLEVEL_OUTOF10: 3=SLIGHT PAIN

## 2025-06-12 NOTE — PROGRESS NOTES
"Subjective:     Fabiola Alexis is a 24 y.o. female who presents for Pelvic Pain (6-days, grey discharge, may potentially have tampon stuck vaginally for about 1-week, heavy blood flow pink in color, nausea, headaches, unknown if pregnant, and Dx with BV one month ago. Lower ABD pain.)      Pelvic Pain    Pt presents for evaluation of a new problem.  Christian is a pleasant 24-year-old female who presents to urgent care today with complaints of ongoing pelvic pain, dysuria and vaginal discharge.  Negative for vaginal itching.  She states that she has been suffering from recurrent infections of bacterial vaginosis and believes that this is the cause of her symptoms.  Patient would also like a pregnancy test and internal exam to evaluate for possible retained tampon.  Last menstrual cycle was 5/26/2025.  Associated symptoms include nausea.  Negative for diarrhea, vomiting, fever or chills.    Review of Systems   Genitourinary:  Positive for pelvic pain.       PMH: Past Medical History[1]  ALLERGIES: Allergies[2]  SURGHX: Past Surgical History[3]  SOCHX: Social History[4]  FH:   Family History   Problem Relation Age of Onset    No Known Problems Mother     No Known Problems Father     No Known Problems Sister     No Known Problems Brother          Objective:   /78 (BP Location: Left arm, Patient Position: Sitting, BP Cuff Size: Large adult)   Pulse 88   Temp 36.2 °C (97.2 °F) (Temporal)   Resp 18   Ht 1.651 m (5' 5\")   Wt 94.3 kg (207 lb 12.8 oz)   LMP 05/26/2025 (Within Days)   SpO2 98%   Breastfeeding Yes   BMI 34.58 kg/m²     Physical Exam  Vitals and nursing note reviewed. Exam conducted with a chaperone present ([employer] medical assistant).   Constitutional:       General: She is not in acute distress.     Appearance: Normal appearance. She is not ill-appearing.   HENT:      Head: Normocephalic and atraumatic.      Right Ear: External ear normal.      Left Ear: External ear normal.      Nose: No " congestion or rhinorrhea.      Mouth/Throat:      Mouth: Mucous membranes are moist.   Eyes:      Extraocular Movements: Extraocular movements intact.      Pupils: Pupils are equal, round, and reactive to light.   Cardiovascular:      Rate and Rhythm: Normal rate and regular rhythm.      Pulses: Normal pulses.      Heart sounds: Normal heart sounds.   Pulmonary:      Effort: Pulmonary effort is normal.      Breath sounds: Normal breath sounds.   Abdominal:      General: Abdomen is flat. Bowel sounds are normal.      Palpations: Abdomen is soft.      Tenderness: There is no abdominal tenderness. There is no right CVA tenderness or left CVA tenderness.   Genitourinary:     Cervix: Discharge and erythema present. No lesion or cervical bleeding.      Comments: Negative for visible foreign body.  Musculoskeletal:         General: Normal range of motion.      Cervical back: Normal range of motion and neck supple.   Skin:     General: Skin is warm and dry.      Capillary Refill: Capillary refill takes less than 2 seconds.   Neurological:      General: No focal deficit present.      Mental Status: She is alert and oriented to person, place, and time. Mental status is at baseline.   Psychiatric:         Mood and Affect: Mood normal.         Behavior: Behavior normal.         Thought Content: Thought content normal.         Judgment: Judgment normal.     Results for orders placed or performed in visit on 06/12/25   POCT Pregnancy    Collection Time: 06/12/25  1:18 PM   Result Value Ref Range    POC Urine Pregnancy Test Negative     Internal Control Positive Positive     Internal Control Negative Negative    POCT Urinalysis    Collection Time: 06/12/25  1:43 PM   Result Value Ref Range    POC Color Yellow Negative    POC Appearance Slightly Cloudy Negative    POC Glucose Negative Negative mg/dL    POC Bilirubin Negative Negative mg/dL    POC Ketones Negative Negative mg/dL    POC Specific Gravity 1.010 <1.005 - >1.030    POC  Blood Negative Negative    POC Urine PH 6.0 5.0 - 8.0    POC Protein Negative Negative mg/dL    POC Urobiligen 0.2 Negative (0.2) mg/dL    POC Nitrites Negative Negative    POC Leukocyte Esterase Trace Negative       Assessment/Plan:   Assessment      1. Vaginal discharge  POCT Urinalysis    POCT Pregnancy    metroNIDAZOLE (METROGEL-VAGINAL) 0.75 % Gel    Chlamydia/GC, PCR (Genital/Anal swab)    CANCELED: VAGINAL PATHOGENS DNA PANEL    CANCELED: Chlamydia/GC, PCR (Genital/Anal swab)    CANCELED: URINE CULTURE(NEW)    CANCELED: Chlamydia/GC, PCR (Genital/Anal swab)    CANCELED: VAGINAL PATHOGENS DNA PANEL      2. Nausea  ondansetron (ZOFRAN ODT) 4 MG TABLET DISPERSIBLE    ondansetron (Zofran ODT) dispertab 4 mg        No visible retained foreign body on exam today.  She was tested for gonorrhea, chlamydia, BV, trichomonas and yeast.  Due to symptoms and previous history of BV she was empirically treated with MetroGel.  I will notify her of results.  She is in agreement with the plan of care today.       [1] No past medical history on file.  [2] No Known Allergies  [3]   Past Surgical History:  Procedure Laterality Date    EYE SURGERY     [4]   Social History  Socioeconomic History    Marital status: Single   Tobacco Use    Smoking status: Never    Smokeless tobacco: Never   Vaping Use    Vaping status: Never Used   Substance and Sexual Activity    Alcohol use: Not Currently    Drug use: Not Currently     Types: Marijuana    Sexual activity: Yes     Partners: Male     Social Drivers of Health     Food Insecurity: No Food Insecurity (1/21/2025)    Hunger Vital Sign     Worried About Running Out of Food in the Last Year: Never true     Ran Out of Food in the Last Year: Never true   Transportation Needs: No Transportation Needs (1/21/2025)    PRAPARE - Transportation     Lack of Transportation (Medical): No     Lack of Transportation (Non-Medical): No   Intimate Partner Violence: Not At Risk (1/21/2025)    Humiliation,  Afraid, Rape, and Kick questionnaire     Fear of Current or Ex-Partner: No     Emotionally Abused: No     Physically Abused: No     Sexually Abused: No   Housing Stability: Unknown (1/21/2025)    Housing Stability Vital Sign     Unable to Pay for Housing in the Last Year: No     Homeless in the Last Year: No

## 2025-06-13 LAB
C TRACH DNA GENITAL QL NAA+PROBE: NEGATIVE
N GONORRHOEA DNA GENITAL QL NAA+PROBE: NEGATIVE
SPECIMEN SOURCE: NORMAL

## 2025-06-18 ENCOUNTER — NON-PROVIDER VISIT (OUTPATIENT)
Dept: OBGYN | Facility: CLINIC | Age: 25
End: 2025-06-18
Payer: MEDICAID

## 2025-06-18 DIAGNOSIS — Z30.42 DEPO-PROVERA CONTRACEPTIVE STATUS: Primary | ICD-10-CM

## 2025-06-18 LAB
POCT INT CON NEG: NEGATIVE
POCT INT CON POS: POSITIVE
POCT URINE PREGNANCY TEST: NEGATIVE

## 2025-06-18 PROCEDURE — 81025 URINE PREGNANCY TEST: CPT | Performed by: PHYSICIAN ASSISTANT

## 2025-06-18 PROCEDURE — 96372 THER/PROPH/DIAG INJ SC/IM: CPT | Performed by: PHYSICIAN ASSISTANT

## 2025-06-18 RX ORDER — MEDROXYPROGESTERONE ACETATE 150 MG/ML
150 INJECTION, SUSPENSION INTRAMUSCULAR ONCE
Status: COMPLETED | OUTPATIENT
Start: 2025-06-18 | End: 2025-06-18

## 2025-06-18 RX ADMIN — MEDROXYPROGESTERONE ACETATE 150 MG: 150 INJECTION, SUSPENSION INTRAMUSCULAR at 13:38

## 2025-06-18 NOTE — PROGRESS NOTES
Date of last Depo Provera Injection: 02/25/25  Current date within therapeutic range?: NO  Urine pregnancy test done (needed if out of date range): UPT - neg   Date of office visit: 06/18/25  Date of last pap (if > 21 years old)/ GYN exam: UTD  Dx: Contraceptive use   Order and dose verified by: SCANNER  Patient tolerated injection and no adverse effects were observed or reported:  NONE  # of Administrations remaining in MAR: 0  Next injection due between .  SEP 3rd-17th    - per pt has not had unprotected sex in the last 1-2 weeks.

## 2025-06-19 ENCOUNTER — APPOINTMENT (OUTPATIENT)
Dept: OBGYN | Facility: CLINIC | Age: 25
End: 2025-06-19
Payer: MEDICAID

## 2025-08-26 ENCOUNTER — APPOINTMENT (OUTPATIENT)
Dept: MEDICAL GROUP | Facility: MEDICAL CENTER | Age: 25
End: 2025-08-26
Payer: MEDICAID